# Patient Record
Sex: FEMALE | Race: WHITE | Employment: OTHER | URBAN - METROPOLITAN AREA
[De-identification: names, ages, dates, MRNs, and addresses within clinical notes are randomized per-mention and may not be internally consistent; named-entity substitution may affect disease eponyms.]

---

## 2017-05-30 ENCOUNTER — APPOINTMENT (OUTPATIENT)
Dept: CT IMAGING | Age: 72
End: 2017-05-30
Attending: EMERGENCY MEDICINE
Payer: MEDICARE

## 2017-05-30 ENCOUNTER — HOSPITAL ENCOUNTER (EMERGENCY)
Age: 72
Discharge: HOME | End: 2017-05-30
Attending: EMERGENCY MEDICINE
Payer: MEDICARE

## 2017-05-30 VITALS
BODY MASS INDEX: 37.06 KG/M2 | HEIGHT: 62 IN | SYSTOLIC BLOOD PRESSURE: 141 MMHG | TEMPERATURE: 97.4 F | RESPIRATION RATE: 18 BRPM | OXYGEN SATURATION: 94 % | DIASTOLIC BLOOD PRESSURE: 87 MMHG | WEIGHT: 201.4 LBS | HEART RATE: 86 BPM

## 2017-05-30 DIAGNOSIS — R51.9 NONINTRACTABLE HEADACHE, UNSPECIFIED CHRONICITY PATTERN, UNSPECIFIED HEADACHE TYPE: Primary | ICD-10-CM

## 2017-05-30 PROCEDURE — 70450 CT HEAD/BRAIN W/O DYE: CPT

## 2017-05-30 PROCEDURE — 99282 EMERGENCY DEPT VISIT SF MDM: CPT

## 2017-05-30 NOTE — Clinical Note
Continue your ibuprofen for pain. Return to ER for any fever, chills, nausea, vomiting, change in behavior, worsening or change in pain.

## 2017-05-30 NOTE — ED PROVIDER NOTES
HPI Comments: 67 y.o. female with past medical history significant for HTN and anxiety who presents from home with chief complaint of headache. Pt complains of slight headache that started Sunday morning. Headache was gradual onset. Pt also states that she woke up at that time with dried blood in her mouth. Pt denies any sign of biting her tongue or epistaxis, but notes that she does have a history of epistaxis and had a dehumidifier in her bedroom. Pt recalls that she fell and hit the back of her head on the edge of the tub on May 5th and was seen at [facility]. Pt had lesly on her scalp and had no complications with staple removal. Pt notes that there is slight tenderness at the site of injury. Pt states that she tore her pectoral muscles in the past from falling backwards and has a knot on her chest. Pt has been taking ibuprofen at home for pain with relief. Pt was told to come to the ED by PCP's nurse. Pt denies any LOC after the fall. Pt also denies any nausea, blurry vision, double vision, neck pain, dizziness, lightheadedness, abdominal pain or dysuria. No chest pain or shortness of breath. no difficulty breathing. Pt also denies any use of blood thinners. There are no other acute medical concerns at this time. Social hx: Former smoker, occasional EtOH use    PCP: Mindy Ashford MD    Note written by Dev Sparrow, as dictated by JERAD Escobar 3:35 PM        The history is provided by the patient. No  was used.         Past Medical History:   Diagnosis Date    Hypertension     Psychiatric disorder     anxiety       Past Surgical History:   Procedure Laterality Date    HX GYN      tubal ligation    HX HEENT  2003    embolization for nosebleed    HX ORTHOPAEDIC  2015    L THR    HX TONSILLECTOMY      VASCULAR SURGERY PROCEDURE UNLIST  2003    temproal artery biopsy         Family History:   Problem Relation Age of Onset    Alcohol abuse Father     Tuberculosis Father        Social History     Social History    Marital status:      Spouse name: N/A    Number of children: N/A    Years of education: N/A     Occupational History    Not on file. Social History Main Topics    Smoking status: Former Smoker     Quit date: 5/26/2003    Smokeless tobacco: Not on file    Alcohol use Yes      Comment: Occ    Drug use: Not on file    Sexual activity: Not on file     Other Topics Concern    Not on file     Social History Narrative         ALLERGIES: Review of patient's allergies indicates no known allergies. Review of Systems   Constitutional: Negative for chills and fatigue. HENT: Negative for congestion, ear pain, nosebleeds, rhinorrhea, sore throat and trouble swallowing. Blood in mouth   Eyes: Negative for photophobia and visual disturbance. Respiratory: Negative for cough, chest tightness and shortness of breath. Cardiovascular: Negative for chest pain. Gastrointestinal: Negative for abdominal pain, nausea and vomiting. Genitourinary: Negative for difficulty urinating, dysuria, frequency and urgency. Musculoskeletal: Negative for arthralgias, joint swelling, myalgias, neck pain and neck stiffness. Skin: Negative for color change and rash. Neurological: Positive for headaches. Negative for dizziness, syncope, weakness, light-headedness and numbness. All other systems reviewed and are negative. Vitals:    05/30/17 1433   BP: (!) 152/94   Pulse: 92   Resp: 18   Temp: 97.6 °F (36.4 °C)   SpO2: 97%   Weight: 91.4 kg (201 lb 6.4 oz)   Height: 5' 2\" (1.575 m)            Physical Exam   Constitutional: She is oriented to person, place, and time. She appears well-developed and well-nourished. No distress. HENT:   Head: Normocephalic and atraumatic. Right Ear: External ear normal.   Left Ear: External ear normal.   Nose: Nose normal.   Mouth/Throat: Oropharynx is clear and moist. No oropharyngeal exudate. No injury to tongue.  No lacerations noted. No ecchymosis. No intraoral injury. No noted area of bleeding or dried blood in nostrils. Eyes: Conjunctivae and EOM are normal. Pupils are equal, round, and reactive to light. Neck: Normal range of motion. Neck supple. Painless FROM of neck. No midline tenderness to palpation of cspine   Cardiovascular: Normal rate and regular rhythm. Pulmonary/Chest: Effort normal and breath sounds normal. No respiratory distress. She has no wheezes. Abdominal: Soft. Bowel sounds are normal. She exhibits no mass. There is no tenderness. There is no rebound and no guarding. Musculoskeletal: Normal range of motion. She exhibits no edema or tenderness. 5/5  strength bilaterally  5/5 flexion/extension of hips bilaterally   Neurological: She is alert and oriented to person, place, and time. She has normal reflexes. No cranial nerve deficit. She exhibits normal muscle tone. Coordination normal.   2+ ac, 2+ patellar reflexes bilaterally    Cranial Nerves:  I: smell  Not tested   II: pupils  Equal, round, reactive to light   III,VII: ptosis  none   III,IV,VI: extraocular muscles   normal   V: mastication  normal   V: facial light touch sensation   normal   VII: facial muscle function   symmetric   VIII: hearing  symmetric   IX: soft palate elevation   normal   XI: sternocleidomastoid strength  5/5   XII: tongue   midline          Skin: Skin is warm and dry. No rash noted. No erythema. Psychiatric: She has a normal mood and affect. Her behavior is normal. Judgment and thought content normal.   Nursing note and vitals reviewed. MDM  Number of Diagnoses or Management Options  Nonintractable headache, unspecified chronicity pattern, unspecified headache type:   Diagnosis management comments: 79-year-old female presenting to the emergency room for evaluation of headache. Headache was gradual onset. Headache is relieved with Motrin. The patient has a history of head injury 25 days ago.  She is alert and oriented. No neurological deficits on exam. She is in no distress  Plan: CT    7:33 PM  No signs of bleeding on exam. Abdomen soft and nontender. Pt neurovascularly intact. No deficits on exam. Patient is well hydrated, well appearing, and in no respiratory distress. Physical exam is reassuring, and without signs of serious illness. Will discharge patient home with supportive care, and follow-up with PCP within the next few days. Patient's results have been reviewed with them. Patient and/or family have verbally conveyed their understanding and agreement of the patient's signs, symptoms, diagnosis, treatment and prognosis and additionally agree to follow up as recommended or return to the Emergency Room should their condition change prior to follow-up. Discharge instructions have also been provided to the patient with some educational information regarding their diagnosis as well a list of reasons why they would want to return to the ER prior to their follow-up appointment should their condition change. ED Course       Procedures         Pt has been seen and evaluated by attending physician who has reviewed lab work and imaging tests. He agrees with discharge and prescription plan.

## 2017-05-30 NOTE — DISCHARGE INSTRUCTIONS
We hope that we have addressed all of your medical concerns. The examination and treatment you received in the Emergency Department were for an emergent problem and were not intended as complete care. It is important that you follow up with your healthcare provider(s) for ongoing care. If your symptoms worsen or do not improve as expected, and you are unable to reach your usual health care provider(s), you should return to the Emergency Department. Today's healthcare is undergoing tremendous change, and patient satisfaction surveys are one of the many tools to assess the quality of medical care. You may receive a survey from the [employer] regarding your experience in the Emergency Department. I hope that your experience has been completely positive, particularly the medical care that I provided. As such, please participate in the survey; anything less than excellent does not meet my expectations or intentions. [facility] and [address] [place] [address] participate in nationally recognized quality of care measures. If your blood pressure is greater than 120/80, as reported below, we urge that you seek medical care to address the potential of high blood pressure, commonly known as hypertension. Hypertension can be hereditary or can be caused by certain medical conditions, pain, stress, or \"white coat syndrome. \"       Please make an appointment with your health care provider(s) for follow up of your Emergency Department visit. VITALS:   Patient Vitals for the past 8 hrs:   Temp Pulse Resp BP SpO2   05/30/17 1433 97.6 °F (36.4 °C) 92 18 (!) 152/94 97 %          Thank you for allowing us to provide you with medical care today. We realize that you have many choices for your emergency care needs. Please choose us in the future for any continued health care needs. Shelley Felipe, [address] [place] [address] [address] Office: [phone]            No results found for this or any previous visit (from the past 24 hour(s)). Ct Head Wo Cont    Result Date: 5/30/2017  INDICATION: pain hx of trauma Exam: Noncontrast CT of the brain is performed with 5 mm collimation. CT dose reduction was achieved with the use of the standardized protocol tailored for this examination and automatic exposure control for dose modulation. Adaptive statistical iterative reconstruction (ASIR) was utilized. FINDINGS: There is no acute intracranial hemorrhage, mass, mass effect or herniation. Ventricular system is normal. The gray-white matter differentiation is well-preserved. There is opacification of several right inferior mastoid air cells. The left mastoid air cells are well pneumatized. The visualized paranasal sinuses are normal.     IMPRESSION: No acute intracranial hemorrhage, mass or infarct. Headache: Care Instructions  Your Care Instructions    Headaches have many possible causes. Most headaches aren't a sign of a more serious problem, and they will get better on their own. Home treatment may help you feel better faster. The doctor has checked you carefully, but problems can develop later. If you notice any problems or new symptoms, get medical treatment right away. Follow-up care is a key part of your treatment and safety. Be sure to make and go to all appointments, and call your doctor if you are having problems. It's also a good idea to know your test results and keep a list of the medicines you take. How can you care for yourself at home? · Do not drive if you have taken a prescription pain medicine. · Rest in a quiet, dark room until your headache is gone. Close your eyes and try to relax or go to sleep. Don't watch TV or read. · Put a cold, moist cloth or cold pack on the painful area for 10 to 20 minutes at a time. Put a thin cloth between the cold pack and your skin.   · Use a warm, moist towel or a heating pad set on low to relax tight shoulder and neck muscles. · Have someone gently massage your neck and shoulders. · Take pain medicines exactly as directed. ¨ If the doctor gave you a prescription medicine for pain, take it as prescribed. ¨ If you are not taking a prescription pain medicine, ask your doctor if you can take an over-the-counter medicine. · Be careful not to take pain medicine more often than the instructions allow, because you may get worse or more frequent headaches when the medicine wears off. · Do not ignore new symptoms that occur with a headache, such as a fever, weakness or numbness, vision changes, or confusion. These may be signs of a more serious problem. To prevent headaches  · Keep a headache diary so you can figure out what triggers your headaches. Avoiding triggers may help you prevent headaches. Record when each headache began, how long it lasted, and what the pain was like (throbbing, aching, stabbing, or dull). Write down any other symptoms you had with the headache, such as nausea, flashing lights or dark spots, or sensitivity to bright light or loud noise. Note if the headache occurred near your period. List anything that might have triggered the headache, such as certain foods (chocolate, cheese, wine) or odors, smoke, bright light, stress, or lack of sleep. · Find healthy ways to deal with stress. Headaches are most common during or right after stressful times. Take time to relax before and after you do something that has caused a headache in the past.  · Try to keep your muscles relaxed by keeping good posture. Check your jaw, face, neck, and shoulder muscles for tension, and try relaxing them. When sitting at a desk, change positions often, and stretch for 30 seconds each hour. · Get plenty of sleep and exercise. · Eat regularly and well. Long periods without food can trigger a headache. · Treat yourself to a massage.  Some people find that regular massages are very helpful in relieving tension. · Limit caffeine by not drinking too much coffee, tea, or soda. But don't quit caffeine suddenly, because that can also give you headaches. · Reduce eyestrain from computers by blinking frequently and looking away from the computer screen every so often. Make sure you have proper eyewear and that your monitor is set up properly, about an arm's length away. · Seek help if you have depression or anxiety. Your headaches may be linked to these conditions. Treatment can both prevent headaches and help with symptoms of anxiety or depression. When should you call for help? Call 911 anytime you think you may need emergency care. For example, call if:  · You have signs of a stroke. These may include:  ¨ Sudden numbness, paralysis, or weakness in your face, arm, or leg, especially on only one side of your body. ¨ Sudden vision changes. ¨ Sudden trouble speaking. ¨ Sudden confusion or trouble understanding simple statements. ¨ Sudden problems with walking or balance. ¨ A sudden, severe headache that is different from past headaches. Call your doctor now or seek immediate medical care if:  · You have a new or worse headache. · Your headache gets much worse. Where can you learn more? Go to http://myrna-lexi.info/. Enter M271 in the search box to learn more about \"Headache: Care Instructions. \"  Current as of: October 14, 2016  Content Version: 11.2  © 2279-4400 [employer]. Care instructions adapted under license by [email or web address] (which disclaims liability or warranty for this information). If you have questions about a medical condition or this instruction, always ask your healthcare professional. [place] [address] any warranty or liability for your use of this information.

## 2017-05-30 NOTE — ED NOTES
I personally saw and examined the patient. I have reviewed and agree with the MLP's findings, including all diagnostic interpretations, and plans as written. I was present during the key portions of separately billed procedures.     Kiet Porter MD

## 2017-05-30 NOTE — ED TRIAGE NOTES
Pt. States she fell May 5 and hit her head on the edge of the tub, seen by MD, head wound stapled and had a headache. States feeling fine since then. States starting this weekend started a headache and had dried blood in her mouth upon waking today.

## 2018-12-03 ENCOUNTER — HOSPITAL ENCOUNTER (OUTPATIENT)
Dept: CT IMAGING | Age: 73
Discharge: HOME | End: 2018-12-03
Attending: SURGERY
Payer: MEDICARE

## 2018-12-03 DIAGNOSIS — I70.213 ATHEROSCLEROSIS OF NATIVE ARTERY OF BOTH LOWER EXTREMITIES WITH INTERMITTENT CLAUDICATION (HCC): ICD-10-CM

## 2018-12-03 PROCEDURE — 74011636320 HC RX REV CODE- 636/320: Performed by: RADIOLOGY

## 2018-12-03 PROCEDURE — 74011000258 HC RX REV CODE- 258: Performed by: RADIOLOGY

## 2018-12-03 PROCEDURE — 75635 CT ANGIO ABDOMINAL ARTERIES: CPT

## 2018-12-03 RX ORDER — SODIUM CHLORIDE 0.9 % (FLUSH) 0.9 %
10 SYRINGE (ML) INJECTION
Status: COMPLETED | OUTPATIENT
Start: 2018-12-03 | End: 2018-12-03

## 2018-12-03 RX ADMIN — IOPAMIDOL 100 ML: 755 INJECTION, SOLUTION INTRAVENOUS at 12:06

## 2018-12-03 RX ADMIN — SODIUM CHLORIDE 100 ML: 900 INJECTION, SOLUTION INTRAVENOUS at 12:06

## 2018-12-03 RX ADMIN — Medication 10 ML: at 12:06

## 2018-12-05 ENCOUNTER — HOSPITAL ENCOUNTER (OUTPATIENT)
Dept: PREADMISSION TESTING | Age: 73
Discharge: HOME | End: 2018-12-05
Attending: SURGERY
Payer: MEDICARE

## 2018-12-05 ENCOUNTER — HOSPITAL ENCOUNTER (OUTPATIENT)
Dept: GENERAL RADIOLOGY | Age: 73
Discharge: HOME | End: 2018-12-05
Attending: SURGERY
Payer: MEDICARE

## 2018-12-05 VITALS
OXYGEN SATURATION: 95 % | TEMPERATURE: 97.7 F | SYSTOLIC BLOOD PRESSURE: 137 MMHG | RESPIRATION RATE: 16 BRPM | WEIGHT: 201.28 LBS | DIASTOLIC BLOOD PRESSURE: 79 MMHG | BODY MASS INDEX: 37.04 KG/M2 | HEART RATE: 73 BPM | HEIGHT: 62 IN

## 2018-12-05 DIAGNOSIS — R06.83 SNORING: Primary | ICD-10-CM

## 2018-12-05 LAB
ABO + RH BLD: NORMAL
ALBUMIN SERPL-MCNC: 3.6 G/DL (ref 3.5–5)
ALBUMIN/GLOB SERPL: 0.8 {RATIO} (ref 1.1–2.2)
ALP SERPL-CCNC: 142 U/L (ref 45–117)
ALT SERPL-CCNC: 26 U/L (ref 12–78)
ANION GAP SERPL CALC-SCNC: 6 MMOL/L (ref 5–15)
APPEARANCE UR: CLEAR
APTT PPP: 27.1 SEC (ref 22.1–32)
AST SERPL-CCNC: 14 U/L (ref 15–37)
ATRIAL RATE: 74 BPM
BACTERIA URNS QL MICRO: NEGATIVE /HPF
BILIRUB SERPL-MCNC: 0.3 MG/DL (ref 0.2–1)
BILIRUB UR QL: NEGATIVE
BLOOD GROUP ANTIBODIES SERPL: NORMAL
BUN SERPL-MCNC: 22 MG/DL (ref 6–20)
BUN/CREAT SERPL: 26 (ref 12–20)
CALCIUM SERPL-MCNC: 9.4 MG/DL (ref 8.5–10.1)
CALCULATED P AXIS, ECG09: 41 DEGREES
CALCULATED R AXIS, ECG10: 22 DEGREES
CALCULATED T AXIS, ECG11: 32 DEGREES
CHLORIDE SERPL-SCNC: 103 MMOL/L (ref 97–108)
CO2 SERPL-SCNC: 30 MMOL/L (ref 21–32)
COLOR UR: ABNORMAL
CREAT SERPL-MCNC: 0.85 MG/DL (ref 0.55–1.02)
DIAGNOSIS, 93000: NORMAL
EPITH CASTS URNS QL MICRO: ABNORMAL /LPF
ERYTHROCYTE [DISTWIDTH] IN BLOOD BY AUTOMATED COUNT: 14.4 % (ref 11.5–14.5)
GLOBULIN SER CALC-MCNC: 4.6 G/DL (ref 2–4)
GLUCOSE SERPL-MCNC: 95 MG/DL (ref 65–100)
GLUCOSE UR STRIP.AUTO-MCNC: NEGATIVE MG/DL
HCT VFR BLD AUTO: 40 % (ref 35–47)
HGB BLD-MCNC: 12.6 G/DL (ref 11.5–16)
HGB UR QL STRIP: NEGATIVE
HYALINE CASTS URNS QL MICRO: ABNORMAL /LPF (ref 0–5)
INR PPP: 1 (ref 0.9–1.1)
KETONES UR QL STRIP.AUTO: NEGATIVE MG/DL
LEUKOCYTE ESTERASE UR QL STRIP.AUTO: ABNORMAL
MCH RBC QN AUTO: 28.9 PG (ref 26–34)
MCHC RBC AUTO-ENTMCNC: 31.5 G/DL (ref 30–36.5)
MCV RBC AUTO: 91.7 FL (ref 80–99)
NITRITE UR QL STRIP.AUTO: NEGATIVE
NRBC # BLD: 0 K/UL (ref 0–0.01)
NRBC BLD-RTO: 0 PER 100 WBC
P-R INTERVAL, ECG05: 164 MS
PH UR STRIP: 5.5 [PH] (ref 5–8)
PLATELET # BLD AUTO: 401 K/UL (ref 150–400)
PMV BLD AUTO: 10.1 FL (ref 8.9–12.9)
POTASSIUM SERPL-SCNC: 3.8 MMOL/L (ref 3.5–5.1)
PROT SERPL-MCNC: 8.2 G/DL (ref 6.4–8.2)
PROT UR STRIP-MCNC: NEGATIVE MG/DL
PROTHROMBIN TIME: 10.1 SEC (ref 9–11.1)
Q-T INTERVAL, ECG07: 410 MS
QRS DURATION, ECG06: 96 MS
QTC CALCULATION (BEZET), ECG08: 455 MS
RBC # BLD AUTO: 4.36 M/UL (ref 3.8–5.2)
RBC #/AREA URNS HPF: ABNORMAL /HPF (ref 0–5)
SODIUM SERPL-SCNC: 139 MMOL/L (ref 136–145)
SP GR UR REFRACTOMETRY: 1.02 (ref 1–1.03)
SPECIMEN EXP DATE BLD: NORMAL
THERAPEUTIC RANGE,PTTT: NORMAL SECS (ref 58–77)
UA: UC IF INDICATED,UAUC: ABNORMAL
UROBILINOGEN UR QL STRIP.AUTO: 0.2 EU/DL (ref 0.2–1)
VENTRICULAR RATE, ECG03: 74 BPM
WBC # BLD AUTO: 8.6 K/UL (ref 3.6–11)
WBC URNS QL MICRO: ABNORMAL /HPF (ref 0–4)

## 2018-12-05 PROCEDURE — 80053 COMPREHEN METABOLIC PANEL: CPT

## 2018-12-05 PROCEDURE — 81001 URINALYSIS AUTO W/SCOPE: CPT

## 2018-12-05 PROCEDURE — 93005 ELECTROCARDIOGRAM TRACING: CPT

## 2018-12-05 PROCEDURE — 86901 BLOOD TYPING SEROLOGIC RH(D): CPT

## 2018-12-05 PROCEDURE — 36415 COLL VENOUS BLD VENIPUNCTURE: CPT

## 2018-12-05 PROCEDURE — 85730 THROMBOPLASTIN TIME PARTIAL: CPT

## 2018-12-05 PROCEDURE — 85027 COMPLETE CBC AUTOMATED: CPT

## 2018-12-05 PROCEDURE — 85610 PROTHROMBIN TIME: CPT

## 2018-12-05 PROCEDURE — 71046 X-RAY EXAM CHEST 2 VIEWS: CPT

## 2018-12-05 RX ORDER — CEFAZOLIN SODIUM/WATER 2 G/20 ML
2 SYRINGE (ML) INTRAVENOUS ONCE
Status: CANCELLED | OUTPATIENT
Start: 2018-12-10 | End: 2018-12-10

## 2018-12-05 RX ORDER — GUAIFENESIN 100 MG/5ML
81 LIQUID (ML) ORAL DAILY
COMMUNITY

## 2018-12-05 RX ORDER — LEVOTHYROXINE SODIUM 50 UG/1
50 TABLET ORAL
COMMUNITY

## 2018-12-05 RX ORDER — SODIUM CHLORIDE, SODIUM LACTATE, POTASSIUM CHLORIDE, CALCIUM CHLORIDE 600; 310; 30; 20 MG/100ML; MG/100ML; MG/100ML; MG/100ML
25 INJECTION, SOLUTION INTRAVENOUS CONTINUOUS
Status: CANCELLED | OUTPATIENT
Start: 2018-12-10

## 2018-12-05 NOTE — PERIOP NOTES
Incentive Tomasz Mercado Using the incentive spirometer helps expand the small air sacs of your lungs, helps you breathe deeply, and helps improve your lung function. Use your incentive spirometer twice a day (10 breaths each time) prior to surgery. How to Use Your Incentive Spirometer: 1. Hold the incentive spirometer in an upright position. 2. Breathe out as usual.  
3. Place the mouthpiece in your mouth and seal your lips tightly around it. 4. Take a deep breath. Breathe in slowly and as deeply as possible. Keep the blue flow rate guide between the arrows. 5. Hold your breath as long as possible. Then exhale slowly and allow the piston to fall to the bottom of the column. 6. Rest for a few seconds and repeat steps one through five at least 10 times. PAT Tidal Volume____1750______________  x______10__________  Date_____12/5/18___________ BRING THE INCENTIVE SPIROMETER WITH YOU TO THE HOSPITAL ON THE DAY OF YOUR SURGERY. Opportunity given to ask and answer questions as well as to observe return demonstration. Patient signature_____________________________    Witness____________________________

## 2018-12-05 NOTE — ADVANCED PRACTICE NURSE
JARRED 5 in PAT assessment. Pt admits to snoring loud enough to be heard through a closed door, pauses in breathing while sleeping but denies ever having been referred for a sleep apnea evaluation. Denies prior complications from anesthesia. Denies decreased cervical ROM. Has permanent crowns in upper front. Referral sent to [facility]. Pt agreeable to referral for sleep apnea evaluation.

## 2018-12-05 NOTE — PERIOP NOTES
[facility] Preoperative Instructions Surgery Date 12/10/18        Time of Arrival 0730 am    Contact # [phone] 1. On the day of your surgery, please report to the Surgical Services Registration Desk and sign in at your designated time. The Surgery Center is located to the right of the Emergency Room. 2. You must have someone with you to drive you home. You should not drive a car for 24 hours following surgery. Please make arrangements for a friend or family member to stay with you for the first 24 hours after your surgery. 3. Do not have anything to eat or drink (including water, gum, mints, coffee, juice) after midnight ?12/9/18 . ? This may not apply to medications prescribed by your physician. ?(Please note below the special instructions with medications to take the morning of your procedure.) 4. We recommend you do not drink any alcoholic beverages for 24 hours before and after your surgery. 5. Contact your surgeons office for instructions on the following medications: non-steroidal anti-inflammatory drugs (i.e. Advil, Aleve), vitamins, and supplements. (Some surgeons will want you to stop these medications prior to surgery and others may allow you to take them) **If you are currently taking Plavix, Coumadin, Aspirin and/or other blood-thinning agents, contact your surgeon for instructions. ** Your surgeon will partner with the physician prescribing these medications to determine if it is safe to stop or if you need to continue taking. Please do not stop taking these medications without instructions from your surgeon 6. Wear comfortable clothes. Wear glasses instead of contacts. Do not bring any money or jewelry. Please bring picture ID, insurance card, and any prearranged co-payment or hospital payment. Do not wear make-up, particularly mascara the morning of your surgery.   Do not wear nail polish, particularly if you are having foot /hand surgery. Wear your hair loose or down, no ponytails, buns, irasema pins or clips. All body piercings must be removed. Please shower with antibacterial soap for three consecutive days before and on the morning of surgery, but do not apply any lotions, powders or deodorants after the shower on the day of surgery. Please use a fresh towels after each shower. Please sleep in clean clothes and change bed linens the night before surgery. Please do not shave for 48 hours prior to surgery. Shaving of the face is acceptable. 7. You should understand that if you do not follow these instructions your surgery may be cancelled. If your physical condition changes (I.e. fever, cold or flu) please contact your surgeon as soon as possible. 8. It is important that you be on time. If a situation occurs where you may be late, please call [phone] (OR Holding Area). 9. If you have any questions and or problems, please call [phone] (Pre-admission Testing). 10. Your surgery time may be subject to change. You will receive a phone call the evening prior if your time changes. 11.  If having outpatient surgery, you must have someone to drive you here, stay with you during the duration of your stay, and to drive you home at time of discharge. 12.   In an effort to improve the efficiency, privacy, and safety for all of our Pre-op patients visitors are not allowed in the Holding area. Once you arrive and are registered your family/visitors will be asked to remain in the waiting room. The Pre-op staff will get you from the Surgical Waiting Area and will explain to you and your family/visitors that the Pre-op phase is beginning. The staff will answer any questions and provide instructions for tracking of the patient, by use of the existing tracking number and color-coded status board in the waiting room.   At this time the staff will also ask for your designated spokesperson information in the event that the physician or staff need to provide an update or obtain any pertinent information. The designated spokesperson will be notified if the physician needs to speak to family during the pre-operative phase. If at any time your family/visitors has questions or concerns they may approach the volunteer desk in the waiting area for assistance. Special Instructions: Use Incentive Spirometer 20 times daily prior to surgery. MEDICATIONS TO TAKE THE MORNING OF SURGERY WITH A SIP OF WATER: Amlodipine, Citalopram, Levothyroxine, Triamterene-HCTZ I understand a pre-operative phone call will be made to verify my surgery time. In the event that I am not available, I give permission for a message to be left on my answering service and/or with another person? yes 
 
 
 
 ___________________      __________   _________ 
  (Signature of Patient)             (Witness)                (Date and Time)

## 2018-12-10 ENCOUNTER — ANESTHESIA EVENT (OUTPATIENT)
Dept: SURGERY | Age: 73
DRG: 254 | End: 2018-12-10
Payer: MEDICARE

## 2018-12-10 ENCOUNTER — HOSPITAL ENCOUNTER (INPATIENT)
Age: 73
LOS: 2 days | Discharge: HOME HEALTH | DRG: 254 | End: 2018-12-12
Attending: SURGERY | Admitting: SURGERY
Payer: MEDICARE

## 2018-12-10 ENCOUNTER — ANESTHESIA (OUTPATIENT)
Dept: SURGERY | Age: 73
DRG: 254 | End: 2018-12-10
Payer: MEDICARE

## 2018-12-10 ENCOUNTER — APPOINTMENT (OUTPATIENT)
Dept: GENERAL RADIOLOGY | Age: 73
DRG: 254 | End: 2018-12-10
Attending: SURGERY
Payer: MEDICARE

## 2018-12-10 DIAGNOSIS — I70.202 FEMORAL ARTERY OCCLUSION, LEFT (HCC): Primary | ICD-10-CM

## 2018-12-10 PROCEDURE — 04UL07Z SUPPLEMENT LEFT FEMORAL ARTERY WITH AUTOLOGOUS TISSUE SUBSTITUTE, OPEN APPROACH: ICD-10-PCS | Performed by: SURGERY

## 2018-12-10 PROCEDURE — 04CL0ZZ EXTIRPATION OF MATTER FROM LEFT FEMORAL ARTERY, OPEN APPROACH: ICD-10-PCS | Performed by: SURGERY

## 2018-12-10 PROCEDURE — 76001 XR FLUOROSCOPY OVER 60 MINUTES: CPT

## 2018-12-10 PROCEDURE — 77030002996 HC SUT SLK J&J -A: Performed by: SURGERY

## 2018-12-10 PROCEDURE — 74011250636 HC RX REV CODE- 250/636: Performed by: SURGERY

## 2018-12-10 PROCEDURE — 76210000017 HC OR PH I REC 1.5 TO 2 HR: Performed by: SURGERY

## 2018-12-10 PROCEDURE — 65270000029 HC RM PRIVATE

## 2018-12-10 PROCEDURE — 77030013079 HC BLNKT BAIR HGGR 3M -A: Performed by: NURSE ANESTHETIST, CERTIFIED REGISTERED

## 2018-12-10 PROCEDURE — 77030020153 HC PRB DOPLR DISP MIZU -C: Performed by: SURGERY

## 2018-12-10 PROCEDURE — 77030002987 HC SUT PROL J&J -B: Performed by: SURGERY

## 2018-12-10 PROCEDURE — 74011250636 HC RX REV CODE- 250/636

## 2018-12-10 PROCEDURE — 77030020256 HC SOL INJ NACL 0.9%  500ML: Performed by: SURGERY

## 2018-12-10 PROCEDURE — 77030013797 HC KT TRNSDUC PRSSR EDWD -A: Performed by: SURGERY

## 2018-12-10 PROCEDURE — 88311 DECALCIFY TISSUE: CPT

## 2018-12-10 PROCEDURE — 77030019908 HC STETH ESOPH SIMS -A: Performed by: NURSE ANESTHETIST, CERTIFIED REGISTERED

## 2018-12-10 PROCEDURE — B41D1ZZ FLUOROSCOPY OF AORTA AND BILATERAL LOWER EXTREMITY ARTERIES USING LOW OSMOLAR CONTRAST: ICD-10-PCS | Performed by: SURGERY

## 2018-12-10 PROCEDURE — 76060000036 HC ANESTHESIA 2.5 TO 3 HR: Performed by: SURGERY

## 2018-12-10 PROCEDURE — 77030011640 HC PAD GRND REM COVD -A: Performed by: SURGERY

## 2018-12-10 PROCEDURE — 77030018673: Performed by: SURGERY

## 2018-12-10 PROCEDURE — C1894 INTRO/SHEATH, NON-LASER: HCPCS | Performed by: SURGERY

## 2018-12-10 PROCEDURE — 74011250637 HC RX REV CODE- 250/637: Performed by: SURGERY

## 2018-12-10 PROCEDURE — 74011250636 HC RX REV CODE- 250/636: Performed by: ANESTHESIOLOGY

## 2018-12-10 PROCEDURE — 77030018846 HC SOL IRR STRL H20 ICUM -A: Performed by: SURGERY

## 2018-12-10 PROCEDURE — 74011250637 HC RX REV CODE- 250/637

## 2018-12-10 PROCEDURE — 74011000250 HC RX REV CODE- 250

## 2018-12-10 PROCEDURE — 77030008684 HC TU ET CUF COVD -B: Performed by: NURSE ANESTHETIST, CERTIFIED REGISTERED

## 2018-12-10 PROCEDURE — 77030008771 HC TU NG SALEM SUMP -A: Performed by: NURSE ANESTHETIST, CERTIFIED REGISTERED

## 2018-12-10 PROCEDURE — 77030014008 HC SPNG HEMSTAT J&J -C: Performed by: SURGERY

## 2018-12-10 PROCEDURE — 74011000272 HC RX REV CODE- 272: Performed by: SURGERY

## 2018-12-10 PROCEDURE — 88304 TISSUE EXAM BY PATHOLOGIST: CPT

## 2018-12-10 PROCEDURE — 72190 X-RAY EXAM OF PELVIS: CPT

## 2018-12-10 PROCEDURE — 77030002916 HC SUT ETHLN J&J -A: Performed by: SURGERY

## 2018-12-10 PROCEDURE — 76010000172 HC OR TIME 2.5 TO 3 HR INTENSV-TIER 1: Performed by: SURGERY

## 2018-12-10 PROCEDURE — 77030026438 HC STYL ET INTUB CARD -A: Performed by: NURSE ANESTHETIST, CERTIFIED REGISTERED

## 2018-12-10 PROCEDURE — 77030031139 HC SUT VCRL2 J&J -A: Performed by: SURGERY

## 2018-12-10 PROCEDURE — 74011636320 HC RX REV CODE- 636/320: Performed by: SURGERY

## 2018-12-10 PROCEDURE — 74011000250 HC RX REV CODE- 250: Performed by: SURGERY

## 2018-12-10 RX ORDER — GUAIFENESIN 100 MG/5ML
81 LIQUID (ML) ORAL DAILY
Status: DISCONTINUED | OUTPATIENT
Start: 2018-12-11 | End: 2018-12-12 | Stop reason: HOSPADM

## 2018-12-10 RX ORDER — LABETALOL HYDROCHLORIDE 5 MG/ML
INJECTION, SOLUTION INTRAVENOUS AS NEEDED
Status: DISCONTINUED | OUTPATIENT
Start: 2018-12-10 | End: 2018-12-10 | Stop reason: HOSPADM

## 2018-12-10 RX ORDER — ONDANSETRON 2 MG/ML
INJECTION INTRAMUSCULAR; INTRAVENOUS AS NEEDED
Status: DISCONTINUED | OUTPATIENT
Start: 2018-12-10 | End: 2018-12-10 | Stop reason: HOSPADM

## 2018-12-10 RX ORDER — FENTANYL CITRATE 50 UG/ML
25 INJECTION, SOLUTION INTRAMUSCULAR; INTRAVENOUS
Status: DISCONTINUED | OUTPATIENT
Start: 2018-12-10 | End: 2018-12-10 | Stop reason: ALTCHOICE

## 2018-12-10 RX ORDER — NEOSTIGMINE METHYLSULFATE 1 MG/ML
INJECTION INTRAVENOUS AS NEEDED
Status: DISCONTINUED | OUTPATIENT
Start: 2018-12-10 | End: 2018-12-10 | Stop reason: HOSPADM

## 2018-12-10 RX ORDER — LIDOCAINE HYDROCHLORIDE 20 MG/ML
INJECTION, SOLUTION EPIDURAL; INFILTRATION; INTRACAUDAL; PERINEURAL AS NEEDED
Status: DISCONTINUED | OUTPATIENT
Start: 2018-12-10 | End: 2018-12-10 | Stop reason: HOSPADM

## 2018-12-10 RX ORDER — ONDANSETRON 2 MG/ML
4 INJECTION INTRAMUSCULAR; INTRAVENOUS AS NEEDED
Status: DISCONTINUED | OUTPATIENT
Start: 2018-12-10 | End: 2018-12-10 | Stop reason: ALTCHOICE

## 2018-12-10 RX ORDER — HEPARIN SODIUM 1000 [USP'U]/ML
INJECTION, SOLUTION INTRAVENOUS; SUBCUTANEOUS AS NEEDED
Status: DISCONTINUED | OUTPATIENT
Start: 2018-12-10 | End: 2018-12-10 | Stop reason: HOSPADM

## 2018-12-10 RX ORDER — CLOPIDOGREL BISULFATE 75 MG/1
75 TABLET ORAL DAILY
Status: DISCONTINUED | OUTPATIENT
Start: 2018-12-10 | End: 2018-12-12 | Stop reason: HOSPADM

## 2018-12-10 RX ORDER — ROCURONIUM BROMIDE 10 MG/ML
INJECTION, SOLUTION INTRAVENOUS AS NEEDED
Status: DISCONTINUED | OUTPATIENT
Start: 2018-12-10 | End: 2018-12-10 | Stop reason: HOSPADM

## 2018-12-10 RX ORDER — MORPHINE SULFATE 2 MG/ML
2 INJECTION, SOLUTION INTRAMUSCULAR; INTRAVENOUS
Status: DISCONTINUED | OUTPATIENT
Start: 2018-12-10 | End: 2018-12-12 | Stop reason: HOSPADM

## 2018-12-10 RX ORDER — CITALOPRAM 20 MG/1
20 TABLET, FILM COATED ORAL
Status: DISCONTINUED | OUTPATIENT
Start: 2018-12-11 | End: 2018-12-12 | Stop reason: HOSPADM

## 2018-12-10 RX ORDER — ENOXAPARIN SODIUM 100 MG/ML
40 INJECTION SUBCUTANEOUS EVERY 24 HOURS
Status: DISCONTINUED | OUTPATIENT
Start: 2018-12-10 | End: 2018-12-12 | Stop reason: HOSPADM

## 2018-12-10 RX ORDER — OXYCODONE AND ACETAMINOPHEN 5; 325 MG/1; MG/1
2 TABLET ORAL
Status: DISCONTINUED | OUTPATIENT
Start: 2018-12-10 | End: 2018-12-12 | Stop reason: HOSPADM

## 2018-12-10 RX ORDER — SUCCINYLCHOLINE CHLORIDE 20 MG/ML
INJECTION INTRAMUSCULAR; INTRAVENOUS AS NEEDED
Status: DISCONTINUED | OUTPATIENT
Start: 2018-12-10 | End: 2018-12-10 | Stop reason: HOSPADM

## 2018-12-10 RX ORDER — FENTANYL CITRATE 50 UG/ML
50 INJECTION, SOLUTION INTRAMUSCULAR; INTRAVENOUS AS NEEDED
Status: DISCONTINUED | OUTPATIENT
Start: 2018-12-10 | End: 2018-12-10 | Stop reason: ALTCHOICE

## 2018-12-10 RX ORDER — MIDAZOLAM HYDROCHLORIDE 1 MG/ML
1 INJECTION, SOLUTION INTRAMUSCULAR; INTRAVENOUS AS NEEDED
Status: DISCONTINUED | OUTPATIENT
Start: 2018-12-10 | End: 2018-12-10 | Stop reason: ALTCHOICE

## 2018-12-10 RX ORDER — PHENYLEPHRINE HCL IN 0.9% NACL 0.4MG/10ML
SYRINGE (ML) INTRAVENOUS AS NEEDED
Status: DISCONTINUED | OUTPATIENT
Start: 2018-12-10 | End: 2018-12-10 | Stop reason: HOSPADM

## 2018-12-10 RX ORDER — LIDOCAINE HYDROCHLORIDE 10 MG/ML
0.1 INJECTION, SOLUTION EPIDURAL; INFILTRATION; INTRACAUDAL; PERINEURAL AS NEEDED
Status: DISCONTINUED | OUTPATIENT
Start: 2018-12-10 | End: 2018-12-10 | Stop reason: ALTCHOICE

## 2018-12-10 RX ORDER — CEFAZOLIN SODIUM/WATER 2 G/20 ML
2 SYRINGE (ML) INTRAVENOUS ONCE
Status: COMPLETED | OUTPATIENT
Start: 2018-12-10 | End: 2018-12-10

## 2018-12-10 RX ORDER — MORPHINE SULFATE 10 MG/ML
2 INJECTION, SOLUTION INTRAMUSCULAR; INTRAVENOUS
Status: DISCONTINUED | OUTPATIENT
Start: 2018-12-10 | End: 2018-12-10 | Stop reason: ALTCHOICE

## 2018-12-10 RX ORDER — SODIUM CHLORIDE, SODIUM LACTATE, POTASSIUM CHLORIDE, CALCIUM CHLORIDE 600; 310; 30; 20 MG/100ML; MG/100ML; MG/100ML; MG/100ML
25 INJECTION, SOLUTION INTRAVENOUS CONTINUOUS
Status: DISCONTINUED | OUTPATIENT
Start: 2018-12-10 | End: 2018-12-10

## 2018-12-10 RX ORDER — CEFAZOLIN SODIUM/WATER 2 G/20 ML
2 SYRINGE (ML) INTRAVENOUS EVERY 8 HOURS
Status: COMPLETED | OUTPATIENT
Start: 2018-12-10 | End: 2018-12-11

## 2018-12-10 RX ORDER — ONDANSETRON 2 MG/ML
4 INJECTION INTRAMUSCULAR; INTRAVENOUS
Status: DISCONTINUED | OUTPATIENT
Start: 2018-12-10 | End: 2018-12-12 | Stop reason: HOSPADM

## 2018-12-10 RX ORDER — SODIUM CHLORIDE 9 MG/ML
50 INJECTION, SOLUTION INTRAVENOUS CONTINUOUS
Status: DISCONTINUED | OUTPATIENT
Start: 2018-12-10 | End: 2018-12-11

## 2018-12-10 RX ORDER — FENTANYL CITRATE 50 UG/ML
INJECTION, SOLUTION INTRAMUSCULAR; INTRAVENOUS AS NEEDED
Status: DISCONTINUED | OUTPATIENT
Start: 2018-12-10 | End: 2018-12-10 | Stop reason: HOSPADM

## 2018-12-10 RX ORDER — KETOROLAC TROMETHAMINE 30 MG/ML
30 INJECTION, SOLUTION INTRAMUSCULAR; INTRAVENOUS
Status: DISCONTINUED | OUTPATIENT
Start: 2018-12-10 | End: 2018-12-12 | Stop reason: HOSPADM

## 2018-12-10 RX ORDER — GLYCOPYRROLATE 0.2 MG/ML
INJECTION INTRAMUSCULAR; INTRAVENOUS AS NEEDED
Status: DISCONTINUED | OUTPATIENT
Start: 2018-12-10 | End: 2018-12-10 | Stop reason: HOSPADM

## 2018-12-10 RX ORDER — AMLODIPINE BESYLATE 5 MG/1
5 TABLET ORAL DAILY
Status: DISCONTINUED | OUTPATIENT
Start: 2018-12-11 | End: 2018-12-12 | Stop reason: HOSPADM

## 2018-12-10 RX ORDER — TRIAMTERENE AND HYDROCHLOROTHIAZIDE 37.5; 25 MG/1; MG/1
1 CAPSULE ORAL DAILY
Status: DISCONTINUED | OUTPATIENT
Start: 2018-12-11 | End: 2018-12-11

## 2018-12-10 RX ORDER — OXYCODONE AND ACETAMINOPHEN 5; 325 MG/1; MG/1
TABLET ORAL
Status: COMPLETED
Start: 2018-12-10 | End: 2018-12-10

## 2018-12-10 RX ORDER — LEVOTHYROXINE SODIUM 50 UG/1
50 TABLET ORAL
Status: DISCONTINUED | OUTPATIENT
Start: 2018-12-11 | End: 2018-12-12 | Stop reason: HOSPADM

## 2018-12-10 RX ORDER — PROPOFOL 10 MG/ML
INJECTION, EMULSION INTRAVENOUS AS NEEDED
Status: DISCONTINUED | OUTPATIENT
Start: 2018-12-10 | End: 2018-12-10 | Stop reason: HOSPADM

## 2018-12-10 RX ORDER — SODIUM CHLORIDE, SODIUM LACTATE, POTASSIUM CHLORIDE, CALCIUM CHLORIDE 600; 310; 30; 20 MG/100ML; MG/100ML; MG/100ML; MG/100ML
25 INJECTION, SOLUTION INTRAVENOUS CONTINUOUS
Status: DISCONTINUED | OUTPATIENT
Start: 2018-12-10 | End: 2018-12-10 | Stop reason: ALTCHOICE

## 2018-12-10 RX ADMIN — Medication 2 G: at 18:46

## 2018-12-10 RX ADMIN — FENTANYL CITRATE 25 MCG: 50 INJECTION, SOLUTION INTRAMUSCULAR; INTRAVENOUS at 11:24

## 2018-12-10 RX ADMIN — OXYCODONE AND ACETAMINOPHEN 1 TABLET: 5; 325 TABLET ORAL at 12:53

## 2018-12-10 RX ADMIN — Medication 80 MCG: at 08:17

## 2018-12-10 RX ADMIN — FENTANYL CITRATE 50 MCG: 50 INJECTION, SOLUTION INTRAMUSCULAR; INTRAVENOUS at 10:17

## 2018-12-10 RX ADMIN — FENTANYL CITRATE 25 MCG: 50 INJECTION, SOLUTION INTRAMUSCULAR; INTRAVENOUS at 11:59

## 2018-12-10 RX ADMIN — HEPARIN SODIUM 5000 UNITS: 1000 INJECTION, SOLUTION INTRAVENOUS; SUBCUTANEOUS at 09:10

## 2018-12-10 RX ADMIN — FENTANYL CITRATE 50 MCG: 50 INJECTION, SOLUTION INTRAMUSCULAR; INTRAVENOUS at 08:41

## 2018-12-10 RX ADMIN — CLOPIDOGREL BISULFATE 75 MG: 75 TABLET ORAL at 14:42

## 2018-12-10 RX ADMIN — NEOSTIGMINE METHYLSULFATE 3 MG: 1 INJECTION INTRAVENOUS at 10:47

## 2018-12-10 RX ADMIN — Medication 2 G: at 08:22

## 2018-12-10 RX ADMIN — SUCCINYLCHOLINE CHLORIDE 120 MG: 20 INJECTION INTRAMUSCULAR; INTRAVENOUS at 08:12

## 2018-12-10 RX ADMIN — LIDOCAINE HYDROCHLORIDE 100 MG: 20 INJECTION, SOLUTION EPIDURAL; INFILTRATION; INTRACAUDAL; PERINEURAL at 08:12

## 2018-12-10 RX ADMIN — FENTANYL CITRATE 25 MCG: 50 INJECTION, SOLUTION INTRAMUSCULAR; INTRAVENOUS at 11:45

## 2018-12-10 RX ADMIN — GLYCOPYRROLATE 0.5 MG: 0.2 INJECTION INTRAMUSCULAR; INTRAVENOUS at 10:47

## 2018-12-10 RX ADMIN — LABETALOL HYDROCHLORIDE 5 MG: 5 INJECTION, SOLUTION INTRAVENOUS at 09:38

## 2018-12-10 RX ADMIN — ROCURONIUM BROMIDE 20 MG: 10 INJECTION, SOLUTION INTRAVENOUS at 09:05

## 2018-12-10 RX ADMIN — SODIUM CHLORIDE, SODIUM LACTATE, POTASSIUM CHLORIDE, AND CALCIUM CHLORIDE 25 ML/HR: 600; 310; 30; 20 INJECTION, SOLUTION INTRAVENOUS at 07:53

## 2018-12-10 RX ADMIN — Medication 80 MCG: at 10:27

## 2018-12-10 RX ADMIN — ENOXAPARIN SODIUM 40 MG: 40 INJECTION, SOLUTION INTRAVENOUS; SUBCUTANEOUS at 23:46

## 2018-12-10 RX ADMIN — PROPOFOL 120 MG: 10 INJECTION, EMULSION INTRAVENOUS at 08:12

## 2018-12-10 RX ADMIN — FENTANYL CITRATE 50 MCG: 50 INJECTION, SOLUTION INTRAMUSCULAR; INTRAVENOUS at 10:45

## 2018-12-10 RX ADMIN — LABETALOL HYDROCHLORIDE 5 MG: 5 INJECTION, SOLUTION INTRAVENOUS at 10:44

## 2018-12-10 RX ADMIN — ONDANSETRON 4 MG: 2 INJECTION INTRAMUSCULAR; INTRAVENOUS at 10:44

## 2018-12-10 RX ADMIN — FENTANYL CITRATE 25 MCG: 50 INJECTION, SOLUTION INTRAMUSCULAR; INTRAVENOUS at 11:34

## 2018-12-10 RX ADMIN — ROCURONIUM BROMIDE 30 MG: 10 INJECTION, SOLUTION INTRAVENOUS at 08:18

## 2018-12-10 RX ADMIN — FENTANYL CITRATE 50 MCG: 50 INJECTION, SOLUTION INTRAMUSCULAR; INTRAVENOUS at 08:08

## 2018-12-10 RX ADMIN — FENTANYL CITRATE 50 MCG: 50 INJECTION, SOLUTION INTRAMUSCULAR; INTRAVENOUS at 08:43

## 2018-12-10 RX ADMIN — FENTANYL CITRATE 50 MCG: 50 INJECTION, SOLUTION INTRAMUSCULAR; INTRAVENOUS at 08:12

## 2018-12-10 NOTE — ANESTHESIA POSTPROCEDURE EVALUATION
Procedure(s): LEFT FEMORAL ENDARTERECTOMY WITH VEIN PATCH. Anesthesia Post Evaluation Patient location during evaluation: PACU Note status: Adequate. Level of consciousness: responsive to verbal stimuli and sleepy but conscious Pain management: satisfactory to patient Airway patency: patent Anesthetic complications: no 
Cardiovascular status: acceptable Respiratory status: acceptable Hydration status: acceptable Comments: +Post-Anesthesia Evaluation and Assessment Patient: Ariella Dumont MRN: [id number]  SSN: [id number] YOB: 1945  Age: 68 y.o. Sex: female Cardiovascular Function/Vital Signs /54   Pulse 69   Temp 36.6 °C (97.8 °F)   Resp 16   Ht 5' 2\" (1.575 m)   Wt 91.2 kg (201 lb 1 oz)   SpO2 97%   BMI 36.77 kg/m² Patient is status post Procedure(s): LEFT FEMORAL ENDARTERECTOMY WITH VEIN PATCH. Nausea/Vomiting: Controlled. Postoperative hydration reviewed and adequate. Pain: 
Pain Scale 1: Numeric (0 - 10) (12/10/18 1200) Pain Intensity 1: 5 (12/10/18 1200) Managed. Neurological Status:  
Neuro (WDL): Within Defined Limits (12/10/18 0759) At baseline. Mental Status and Level of Consciousness: Arousable. Pulmonary Status:  
O2 Device: Nasal cannula (12/10/18 1110) Adequate oxygenation and airway patent. Complications related to anesthesia: None Post-anesthesia assessment completed. No concerns. Signed By: Hedy Gordon MD  
 12/10/2018 Post anesthesia nausea and vomiting:  controlled Visit Vitals /54 Pulse 69 Temp 36.6 °C (97.8 °F) Resp 16 Ht 5' 2\" (1.575 m) Wt 91.2 kg (201 lb 1 oz) SpO2 97% BMI 36.77 kg/m²

## 2018-12-10 NOTE — OP NOTES
[facility]  OPERATIVE REPORT    Brandon Lawson  MR#: [id number]  : 1945  ACCOUNT #: [de-identified]   DATE OF SERVICE: 12/10/2018    PREOPERATIVE DIAGNOSIS:  Left femoral artery occlusion. POSTOPERATIVE DIAGNOSIS:  Left femoral artery occlusion. PROCEDURES PERFORMED:  1. Left groin exploration with left common femoral artery endarterectomy and greater saphenous vein patch angioplasty. 2.  Sheath placement with intrapelvic aortogram and pelvic arteriogram.  3.  Left leg runoff arteriogram using fluoroscopy. 4.  Intraoperative pressure monitoring. SURGEON:  Deisy Almendarez MD    ANESTHESIA:  General.    SPECIMENS REMOVED:  None. ESTIMATED BLOOD LOSS:  150 mL from flushing. INDICATIONS:  The patient is a 77-year-old woman who approximately 2 weeks ago underwent bilateral percutaneous common femoral artery access for kissing iliac stents. Several days after the procedure, she noticed a recurrence of symptoms on the left and was found on noninvasive testing to have a focal occlusion of her distal common femoral artery just above the bifurcation probably related to the access site and closure device. PROCEDURE AS FOLLOWS:  After informed consent, the patient was placed supine on the operating table. After adequate induction of general anesthesia, site and patient confirmation, administration of prophylactic antibiotics, the lower abdomen, left groin, entire left leg prepped and draped in usual sterile fashion. Vertical incision was made in the left groin and the operative field from just above the inguinal ligament to below the femoral bifurcation was created. The saphenofemoral junction and proximal saphenous vein were identified. The distal external iliac and common femoral artery were identified and were surprisingly scarred given no prior operative procedures only percutaneous one. The femoral artery was dissected to its bifurcation.   A StarClose closure device was noted (as suggested on CT scan) to be positioned at the 12 o'clock position on the surface of the vessel. This was a somewhat lower than expected and was 5-6 mm above the bifurcation. The superficial femoral and profunda femoral arteries were dissected free and controlled. The patient was systemically heparinized. A generous longitudinal arteriotomy was made. The artery was occluded at the access site by a heavy eccentric plaque. A complete endarterectomy from distal external iliac to the origin of the superficial femoral artery was undertaken with a good endpoint. The endarterectomized segment was meticulously inspected for loose debris, irrigated and evacuated. A 7-Wolof sheath was placed retrograde into the external iliac artery and an aortogram and pelvic arteriogram confirmed that the kissing iliac stents were both widely patent. There was no external iliac artery disease on the left. A sterile pressure tubing was passed from anesthesia on to the field and the external iliac pressure was transduced and was a mean of 69, which was identical to the brachial pressure on the right, which was obtained by cuff. The 7 sheath was then redirected antegrade down the leg and an arteriogram from that point to the foot confirmed no further occlusive disease or defects. The superficial femoral artery is widely patent as 3-vessel runoff to the foot, ankle. The proximal greater saphenous vein was harvested and used to vein patch the arteriotomy using 2 continuous Prolene sutures. At the completion of the patch angioplasty, there was excellent flow in the profunda femoris and superficial femoral artery and there is a palpable dorsal pedal pulse at the foot. The wound was copiously irrigated with antibiotic solution and closed in 3 layers of Vicryl suture and the layer of skin staples. Patient tolerated the procedure well. There were no complications.       MD NISH Rodriguez / HARRY  D: 12/10/2018 14:34     T: 12/10/2018 15:50  JOB #: [id number]  CC: Meagan Alexandra MD  CC: Jose Armando Goldsmith MD

## 2018-12-10 NOTE — PERIOP NOTES
12:00 room assignment pending,  received post op update 13:55 room assignment still pending,  & other family mbrs visiting with patient in pacu. 15:48 room 2103 in process of being cleaned. Rodriguez Rosen notified of pending room assignment via cell phone [phone] 16:49 Report called to floor. Rhea SWAIN reports room in process of being cleansed. 17:45 Josie Mcmillan RN on floor viewed left groin drsg remains dry & intact. No hematoma or bleeding

## 2018-12-10 NOTE — PERIOP NOTES
Handoff Report from Operating Room to PACU Report received from [facility]  and [facility] regarding Chandan Brown. Surgeon(s): 
Quin Kidd MD  And Procedure(s) (LRB): LEFT FEMORAL ENDARTERECTOMY WITH VEIN PATCH (Left)  confirmed  
with allergies and dressings discussed. Anesthesia type, drugs, patient history, complications, estimated blood loss, vital signs, intake and output, and last pain medication and reversal medications were reviewed.

## 2018-12-10 NOTE — PERIOP NOTES
TRANSFER - OUT REPORT: 
 
Verbal report given to Rhea SWAIN  on Naa Bear  being transferred to 2103(unit) for routine post - op Report consisted of patients Situation, Background, Assessment and  
Recommendations(SBAR). Information from the following report(s) SBAR, OR Summary, Procedure Summary, Intake/Output, MAR and Cardiac Rhythm NSR was reviewed with the receiving nurse. Opportunity for questions and clarification was provided. Patient transported with: 
 O2 @ 2 liters Registered Nurse Tech

## 2018-12-10 NOTE — ANESTHESIA PREPROCEDURE EVALUATION
Anesthetic History No history of anesthetic complications Review of Systems / Medical History Patient summary reviewed, nursing notes reviewed and pertinent labs reviewed Pulmonary Within defined limits Neuro/Psych Psychiatric history Cardiovascular Hypertension PAD Exercise tolerance: >4 METS 
  
GI/Hepatic/Renal 
Within defined limits Endo/Other Hypothyroidism Obesity Other Findings Physical Exam 
 
Airway Mallampati: III 
TM Distance: 4 - 6 cm Neck ROM: normal range of motion Mouth opening: Normal 
 
 Cardiovascular Rhythm: regular Rate: normal 
 
 
 
 Dental 
 
Dentition: Caps/crowns, Bridges, Lower dentition intact and Upper dentition intact Pulmonary Breath sounds clear to auscultation Abdominal 
GI exam deferred Other Findings Anesthetic Plan ASA: 3 Anesthesia type: general 
 
 
 
 
Induction: Intravenous Anesthetic plan and risks discussed with: Patient

## 2018-12-10 NOTE — BRIEF OP NOTE
BRIEF OPERATIVE NOTE Date of Procedure: 12/10/2018 Preoperative Diagnosis: L femoral artery occlusion Postoperative Diagnosis: same Procedure(s): L CFA endarterectomy w/GSV patch Pelvic arteriogram 
                          LLE runoff Intra-op pressure monitoring Surgeon: Christiana Acosta MD  
Anesthesia: General  
Estimated Blood Loss: 150cc of flushing Specimens:  
ID Type Source Tests Collected by Time Destination 1 : LEFT FEMORAL ARTERY PLAQUE Preservative Artery  Equilla MD Nelda 12/10/2018 4024 Pathology Findings: occluding plaque femoral bifurcation at site of closure device 2+ L DP pulse in PACU Complications: none Implants: * No implants in log *

## 2018-12-11 LAB
ANION GAP SERPL CALC-SCNC: 7 MMOL/L (ref 5–15)
BUN SERPL-MCNC: 16 MG/DL (ref 6–20)
BUN/CREAT SERPL: 19 (ref 12–20)
CALCIUM SERPL-MCNC: 9.1 MG/DL (ref 8.5–10.1)
CHLORIDE SERPL-SCNC: 105 MMOL/L (ref 97–108)
CO2 SERPL-SCNC: 27 MMOL/L (ref 21–32)
CREAT SERPL-MCNC: 0.83 MG/DL (ref 0.55–1.02)
ERYTHROCYTE [DISTWIDTH] IN BLOOD BY AUTOMATED COUNT: 14.5 % (ref 11.5–14.5)
GLUCOSE SERPL-MCNC: 157 MG/DL (ref 65–100)
HCT VFR BLD AUTO: 33 % (ref 35–47)
HGB BLD-MCNC: 10.3 G/DL (ref 11.5–16)
MCH RBC QN AUTO: 28.7 PG (ref 26–34)
MCHC RBC AUTO-ENTMCNC: 31.2 G/DL (ref 30–36.5)
MCV RBC AUTO: 91.9 FL (ref 80–99)
NRBC # BLD: 0 K/UL (ref 0–0.01)
NRBC BLD-RTO: 0 PER 100 WBC
PLATELET # BLD AUTO: 399 K/UL (ref 150–400)
PMV BLD AUTO: 10.1 FL (ref 8.9–12.9)
POTASSIUM SERPL-SCNC: 3.7 MMOL/L (ref 3.5–5.1)
RBC # BLD AUTO: 3.59 M/UL (ref 3.8–5.2)
SODIUM SERPL-SCNC: 139 MMOL/L (ref 136–145)
WBC # BLD AUTO: 12.3 K/UL (ref 3.6–11)

## 2018-12-11 PROCEDURE — 65270000029 HC RM PRIVATE

## 2018-12-11 PROCEDURE — 74011250636 HC RX REV CODE- 250/636: Performed by: SURGERY

## 2018-12-11 PROCEDURE — 85027 COMPLETE CBC AUTOMATED: CPT

## 2018-12-11 PROCEDURE — 80048 BASIC METABOLIC PNL TOTAL CA: CPT

## 2018-12-11 PROCEDURE — 74011250637 HC RX REV CODE- 250/637: Performed by: SURGERY

## 2018-12-11 PROCEDURE — 36415 COLL VENOUS BLD VENIPUNCTURE: CPT

## 2018-12-11 RX ORDER — TRIAMTERENE/HYDROCHLOROTHIAZID 37.5-25 MG
1 TABLET ORAL DAILY
Status: DISCONTINUED | OUTPATIENT
Start: 2018-12-11 | End: 2018-12-11

## 2018-12-11 RX ORDER — TRIAMTERENE/HYDROCHLOROTHIAZID 37.5-25 MG
1 TABLET ORAL DAILY
Status: DISCONTINUED | OUTPATIENT
Start: 2018-12-11 | End: 2018-12-12 | Stop reason: HOSPADM

## 2018-12-11 RX ADMIN — Medication 2 G: at 01:14

## 2018-12-11 RX ADMIN — CITALOPRAM HYDROBROMIDE 20 MG: 20 TABLET ORAL at 06:31

## 2018-12-11 RX ADMIN — CLOPIDOGREL BISULFATE 75 MG: 75 TABLET ORAL at 08:56

## 2018-12-11 RX ADMIN — TRIAMTERENE AND HYDROCHLOROTHIAZIDE 1 TABLET: 37.5; 25 TABLET ORAL at 09:02

## 2018-12-11 RX ADMIN — ENOXAPARIN SODIUM 40 MG: 40 INJECTION, SOLUTION INTRAVENOUS; SUBCUTANEOUS at 22:49

## 2018-12-11 RX ADMIN — AMLODIPINE BESYLATE 5 MG: 5 TABLET ORAL at 08:56

## 2018-12-11 RX ADMIN — LEVOTHYROXINE SODIUM 50 MCG: 50 TABLET ORAL at 05:51

## 2018-12-11 RX ADMIN — ASPIRIN 81 MG 81 MG: 81 TABLET ORAL at 08:56

## 2018-12-11 NOTE — PROGRESS NOTES
PCP ANTOINETTE appt scheduled with Dr. Michael Terry on 12/20/2018 at 2:45pm. Appt added to AVS. Dominic Handy CM Specialist

## 2018-12-11 NOTE — PROGRESS NOTES
Assumed care of pt at [facility], pt in stable condition w/o voiding post operatively. RN had concerns r/t to voiding and preformed a bladder scan. Pt had a residual post void of 200 ml/hr and pt still had 750 ml. When the olmedo was placed she had 1500 ml of output when the olmedo was placed. Pt has been resting comfortably. Vitals:  
Blood pressure 110/75, pulse 80, temperature 98.4 °F (36.9 °C), resp. rate 16, height 5' 2\" (1.575 m), weight 91.2 kg (201 lb 1 oz), SpO2 98 %.

## 2018-12-11 NOTE — PROGRESS NOTES
General Surgery CM attempted to see Pt but she was on the phone. She will call me once her phone call is complete. 3:35 PM  Reason for Admission:   Pt was admitted on 12/10/18 d/t a Dx of PVD                   RRAT Score:          5           Plan for utilizing home health:      Not indicated at this time. Pt is not homebound. .                    Likelihood of Readmission:  LOW                         Transition of Care Plan: CM reviewed demographic information with Pt. Pt lives with spouse in two story condo with 5 OLVIN and 17 Steps inside to second floor bedroom. No DME. No experience with HH or SNF. I W ADLs and Drives. Pt pharmacy is [facility] [address] is anticipating DC tomorrow, 12/12/18. Spouse will transport home in car. No anticipated needs. CM emailed CM specialist to make PCP appt. CM will continue to monitor discharge plan. Care Management Interventions  PCP Verified by CM: Yes  Palliative Care Criteria Met (RRAT>21 & CHF Dx)?: No  Mode of Transport at Discharge:  Other (see comment)  Transition of Care Consult (CM Consult): Discharge Planning  MyChart Signup: No  Discharge Durable Medical Equipment: No  Physical Therapy Consult: No  Occupational Therapy Consult: No  Speech Therapy Consult: No  Current Support Network: Lives with Spouse, Own Home  Confirm Follow Up Transport: Self  Plan discussed with Pt/Family/Caregiver: Yes  Freedom of Choice Offered: Yes  Discharge Location  Discharge Placement: Home with family assistance    Ru Keyes

## 2018-12-11 NOTE — PROGRESS NOTES
Initial Nutrition Assessment:    INTERVENTIONS/RECOMMENDATIONS:   · Meals/Snacks: General/healthful diet: Continue cardiac diet    ASSESSMENT:   Patient medically noted for femoral artery occlusion s/p endarterectomy. Receiving a cardiac diet. Patient reports a good appetite currently and PTA. Ate 100% of lunch today. No recent weight changes. Provided patient with menu and explanation of room service. Encouraged intake of meals. Diet Order: Cardiac  % Eaten:    Patient Vitals for the past 72 hrs:   % Diet Eaten   12/10/18 1905 100 %       Pertinent Medications: [x]Reviewed []Other: Norvasc, Celexa, Plavix, Synthroid, Maxzide  Pertinent Labs: [x]Reviewed []Other:   Food Allergies: [x]None []Other   Last BM: 12/9  []Active     []Hyperactive  []Hypoactive       [] Absent BS  Skin:    [] Intact   [x] Incision  [] Breakdown: [] Edema []Other:    Anthropometrics:   Height: 5' 2\" (157.5 cm) Weight: 91.2 kg (201 lb 1 oz)   IBW (%IBW):   ( ) UBW (%UBW):   (  %)   Last Weight Metrics:  Weight Loss Metrics 12/10/2018 12/5/2018 5/30/2017 12/27/2012 12/26/2012 5/31/2011 5/26/2011   Today's Wt 201 lb 1 oz 201 lb 4.5 oz 201 lb 6.4 oz 180 lb 180 lb 193 lb 193 lb   BMI 36.77 kg/m2 36.81 kg/m2 36.84 kg/m2 34.03 kg/m2 34.03 kg/m2 35.88 kg/m2 35.88 kg/m2       BMI: Body mass index is 36.77 kg/m². This BMI is indicative of:   []Underweight    []Normal    []Overweight    [x] Obesity   [] Extreme Obesity (BMI>40)     Estimated Nutrition Needs (Based on):   6141 Kcals/day(BMR (1368) x 1. 3AF) , 73 g(-91g (0.8-1.0 g/kg bw)) Protein  Carbohydrate:  At Least 130 g/day  Fluids: 1800 mL/day (1ml/kcal)    Pt expected to meet estimated nutrient needs: [x]Yes []No    NUTRITION DIAGNOSES:   Problem:  No nutritional diagnosis at this time      Etiology: related to       Signs/Symptoms: as evidenced by        NUTRITION INTERVENTIONS:  Meals/Snacks: General/healthful diet                  GOAL:   PO intake >70% of meals next 5-7 days    LEARNING NEEDS (Diet, Food/Nutrient-Drug Interaction):    [x] None Identified   [] Identified and Education Provided/Documented   [] Identified and Pt declined/was not appropriate     Cultural, Gnosticist, OR Ethnic Dietary Needs:    [x] None Identified   [] Identified and Addressed     [x] Interdisciplinary Care Plan Reviewed/Documented    [x] Discharge Planning: Heart healthy diet        MONITORING /EVALUATION:     Food/Nutrient Intake Outcomes:  Total energy intake  Physical Signs/Symptoms Outcomes: Weight/weight change    NUTRITION RISK:    [] High              [] Moderate           [x]  Low  []  Minimal/Uncompromised    PT SEEN FOR:    []  MD Consult: []Calorie Count      []Diabetic Diet Education        []Diet Education     []Electrolyte Management     []General Nutrition Management and Supplements     []Management of Tube Feeding     []TPN Recommendations    [x]  RN Referral:  [x]MST score >=2     []Enteral/Parenteral Nutrition PTA     []Pregnant: Gestational DM or Multigestation     []Pressure Ulcer/Wound Care needs        []  Low BMI  []  TRAE Clementsdora Pair  Pager [phone]                 Weekend Pager [phone]

## 2018-12-11 NOTE — PROGRESS NOTES
POD#1 Urinary retention overnight No c/o Groin dry 2+ L DP pulse Labs OK Lines and tubes out 
mobilize

## 2018-12-12 ENCOUNTER — HOME HEALTH ADMISSION (OUTPATIENT)
Dept: HOME HEALTH SERVICES | Facility: HOME HEALTH | Age: 73
End: 2018-12-12

## 2018-12-12 VITALS
OXYGEN SATURATION: 98 % | HEIGHT: 62 IN | HEART RATE: 84 BPM | DIASTOLIC BLOOD PRESSURE: 55 MMHG | WEIGHT: 201.06 LBS | RESPIRATION RATE: 12 BRPM | BODY MASS INDEX: 37 KG/M2 | SYSTOLIC BLOOD PRESSURE: 140 MMHG | TEMPERATURE: 98.7 F

## 2018-12-12 PROCEDURE — 97161 PT EVAL LOW COMPLEX 20 MIN: CPT | Performed by: PHYSICAL THERAPIST

## 2018-12-12 PROCEDURE — G8980 MOBILITY D/C STATUS: HCPCS | Performed by: PHYSICAL THERAPIST

## 2018-12-12 PROCEDURE — 97116 GAIT TRAINING THERAPY: CPT | Performed by: PHYSICAL THERAPIST

## 2018-12-12 PROCEDURE — G8979 MOBILITY GOAL STATUS: HCPCS | Performed by: PHYSICAL THERAPIST

## 2018-12-12 PROCEDURE — 97530 THERAPEUTIC ACTIVITIES: CPT | Performed by: PHYSICAL THERAPIST

## 2018-12-12 PROCEDURE — 74011250637 HC RX REV CODE- 250/637: Performed by: SURGERY

## 2018-12-12 PROCEDURE — G8978 MOBILITY CURRENT STATUS: HCPCS | Performed by: PHYSICAL THERAPIST

## 2018-12-12 RX ORDER — CLOPIDOGREL BISULFATE 75 MG/1
75 TABLET ORAL DAILY
Qty: 30 TAB | Refills: 3 | Status: SHIPPED | OUTPATIENT
Start: 2018-12-13 | End: 2019-02-14

## 2018-12-12 RX ORDER — HYDROCODONE BITARTRATE AND ACETAMINOPHEN 5; 325 MG/1; MG/1
1 TABLET ORAL
Qty: 20 TAB | Refills: 0 | Status: SHIPPED | OUTPATIENT
Start: 2018-12-12 | End: 2019-02-09

## 2018-12-12 RX ADMIN — ASPIRIN 81 MG 81 MG: 81 TABLET ORAL at 09:35

## 2018-12-12 RX ADMIN — CITALOPRAM HYDROBROMIDE 20 MG: 20 TABLET ORAL at 07:16

## 2018-12-12 RX ADMIN — LEVOTHYROXINE SODIUM 50 MCG: 50 TABLET ORAL at 07:16

## 2018-12-12 RX ADMIN — AMLODIPINE BESYLATE 5 MG: 5 TABLET ORAL at 09:34

## 2018-12-12 RX ADMIN — CLOPIDOGREL BISULFATE 75 MG: 75 TABLET ORAL at 09:35

## 2018-12-12 RX ADMIN — TRIAMTERENE AND HYDROCHLOROTHIAZIDE 1 TABLET: 37.5; 25 TABLET ORAL at 09:35

## 2018-12-12 NOTE — PROGRESS NOTES
physical Therapy EVALUATION/DISCHARGE  Patient: Brooklyn Epperson (20 y.o. female)  Date: 12/12/2018  Primary Diagnosis: ASO WITH CLAUDICATION  Femoral artery occlusion, left (HCC)  Procedure(s) (LRB):  LEFT FEMORAL ENDARTERECTOMY WITH VEIN PATCH (Left) 2 Days Post-Op     ASSESSMENT :  Based on the objective data described below, the patient presents with impaired mobility, balance and endurance with increased discomfort at surgery site which limits functional independence. Patient requiring additional time for all mobility tasks. She requires min A for bed mobility. Patient is able to ambulate 70 feet without device with SBA- gait is slightly unsteady demonstrating short shuffled and antalgic steps. One minor loss of balance noted but patient able to self correct. Patient able to ascend/descend 11 stairs using B railing with CGA. Patient educated on safety on stairs. All questions answered. Patient reports complete independence at baseline. She lives with her  who works during the day. Patient is currently functioning below her functional baseline. Recommend HHPT for safety evaluation following discharge to ensure safe mobility within home environment. Patient has discharge orders written and will d/c home later today. Will sign off. PLAN :  Discharge Recommendations: Home Health  Further Equipment Recommendations for Discharge: has appropriate equipment if needed     SUBJECTIVE:   Patient stated I'm feeling a little queasy right now.     OBJECTIVE DATA SUMMARY:   HISTORY:    Past Medical History:   Diagnosis Date    Hypertension     Psychiatric disorder     anxiety    Thyroid disease     Hypothyroid     Past Surgical History:   Procedure Laterality Date    HX GYN      tubal ligation    HX HEENT  2003    embolization for nosebleed    HX TONSILLECTOMY      age 25    TOTAL HIP ARTHROPLASTY Left 2015    L THR    VASCULAR SURGERY PROCEDURE UNLIST  2003    temproal artery biopsy    VASCULAR SURGERY PROCEDURE UNLIST  7798    stents (umbilical area); Dr. Jorge Luis Bass     Prior Level of Function/Home Situation: patient reports complete independence with all mobility and ADLs; Home Situation  Home Environment: Private residence  # Steps to Enter: 5  Rails to Enter: Yes  Hand Rails : Bilateral  One/Two Story Residence: Two story  # of Interior Steps: 15  Interior Rails: Both  Living Alone: No  Support Systems: Spouse/Significant Other/Partner  Patient Expects to be Discharged to[de-identified] Private residence  Current DME Used/Available at Home: Shower chair, Cane, straight, Walker, rolling  Tub or Shower Type: Tub/Shower combination    EXAMINATION/PRESENTATION/DECISION MAKING:   Critical Behavior:  Neurologic State: Appropriate for age  Orientation Level: Oriented X4  Cognition: Follows commands     Hearing: Auditory  Auditory Impairment: None    Range Of Motion:  AROM: Generally decreased, functional(excpet R shoulder grossly limited 2/2 pain following shot)                       Strength:    Strength: Generally decreased, functional                    Tone & Sensation:   Tone: Normal                              Coordination:  Coordination: Within functional limits  Functional Mobility:  Bed Mobility:  Rolling: Modified independent; Additional time  Supine to Sit: Minimum assistance; Additional time  Sit to Supine: Minimum assistance; Additional time  Scooting: Supervision; Additional time  Transfers:  Sit to Stand: Stand-by assistance; Additional time  Stand to Sit: Stand-by assistance; Additional time                       Balance:   Sitting: Intact  Standing: Impaired  Standing - Static: Good; Unsupported  Standing - Dynamic : Good(overall with one minor LOB-able to self correct)  Ambulation/Gait Training:  Distance (ft): 70 Feet (ft)  Assistive Device: Gait belt  Ambulation - Level of Assistance: Stand-by assistance        Gait Abnormalities: Antalgic;Decreased step clearance; Path deviations        Base of Support: Widened     Speed/Ibis: Pace decreased (<100 feet/min); Slow  Step Length: Left shortened;Right shortened         gait is slightly unsteady with one minor LOB which patient was able to self correct independently;          Stairs:  Number of Stairs Trained: 11  Stairs - Level of Assistance: Contact guard assistance   Rail Use: Both         Functional Measure:    Elder Mobility Scale    15/20         EMS and G-code impairment scale:  Percentage of impairment CH  0% CI  1-19% CJ  20-39% CK  40-59% CL  60-79% CM  80-99% CN  100%   EMS Score 0-20 20 17-19 13-16 9-12 5-8 1-4 0      Scores under 10  generally these patients are dependent in mobility maneuvers; require help with  basic ADL, such as transfers, toileting and dressing. Scores between 10  13  generally these patients are borderline in terms of safe mobility and  independence in ADL i.e. they require some help with some mobility maneuvers. Scores over 14  Generally these patients are able to perform mobility maneuvers alone and safely  and are independent in basic ADL. G codes: In compliance with CMSs Claims Based Outcome Reporting, the following G-code set was chosen for this patient based on their primary functional limitation being treated: The outcome measure chosen to determine the severity of the functional limitation was the Elderly Mobility scale with a score of 15/20 which was correlated with the impairment scale. ? Mobility - Walking and Moving Around:     - CURRENT STATUS: CJ - 20%-39% impaired, limited or restricted    - GOAL STATUS: CJ - 20%-39% impaired, limited or restricted    - D/C STATUS:  CJ - 20%-39% impaired, limited or restricted            Pain:   patient reports soreness in groin area but no pain        Activity Tolerance:   VSS  Please refer to the flowsheet for vital signs taken during this treatment.   After treatment:   []   Patient left in no apparent distress sitting up in chair  [x] Patient left in no apparent distress in bed  [x]   Call bell left within reach  [x]   Nursing notified  []   Caregiver present  []   Bed alarm activated    COMMUNICATION/EDUCATION:   Communication/Collaboration:  [x]   Fall prevention education was provided and the patient/caregiver indicated understanding. [x]   Patient/family have participated as able and agree with findings and recommendations. []   Patient is unable to participate in plan of care at this time.   Findings and recommendations were discussed with: Registered Nurse,  and Rehabilitation Attendant    Thank you for this referral.  Will Mccray, PT   Time Calculation: 26 mins

## 2018-12-12 NOTE — PROGRESS NOTES
Assumed care of patient at [address] [place] [address], pt in stable condition w/o complaints of pain. Pt did have concerns about whether they could take medication at night to assist with sleep. Pt voided as well over night, and did not complain of discomfort. Pt appears to be resting comfortably. Vital:   Blood pressure 129/57, pulse 81, temperature 97.7 °F (36.5 °C), resp. rate 18, height 5' 2\" (1.575 m), weight 91.2 kg (201 lb 1 oz), SpO2 94 %.

## 2018-12-12 NOTE — PROGRESS NOTES
12: 28PM  D/c order acknowledged by CM. Pt to d/c home with no CM needs. Pt's  to transport home at d/c. PCP appt scheduled and on AVS. Pt ready for d/c from CM perspective. 2:58PM  PC from PT. PT saw pt and recommending HH for safety eval. CM met with pt who is agreeable. Would like to use [facility] for [facility]. Agreeable to other agency if [facility] not available. FOC signed and on chart. Referral sent to [facility] through [address] [place] [address]. Waiting for response. 3:20PM  Referral accepted by [facility]. Information added to AVS. Pt now ready for d/c from CM perspective. CM available for any additional needs.      Daniel New, MSW  Care Manager

## 2018-12-12 NOTE — PROGRESS NOTES
General Surgery End of Shift Nursing Note    Bedside shift change report given to WILIAM Meyers (oncoming nurse) by [facility], RN (offgoing nurse). Report included the following information SBAR, Kardex, MAR and Accordion. Shift worked:   7am-7pm   Summary of shift:    Wolfe removed this morning. Pt voided. Pt ambulated in the paul and in the room. NO complaints this shift. Issues for physician to address:   None     Number times ambulated in hallway past shift: 2    Number of times OOB to chair past shift: 2    Pain Management:  Current medication:   Patient states pain is manageable on current pain medication: denies pain    GI:    Current diet:  DIET CARDIAC Regular    Tolerating current diet: YES  Passing flatus: YES  Last Bowel Movement: yesterday   Appearance: prior to admission    Respiratory:    Incentive Spirometer at bedside: NO  Patient instructed on use: NO    Patient Safety:    Falls Score: 1  Bed Alarm On? No  Sitter?  No    Danielle Rail

## 2018-12-12 NOTE — DISCHARGE INSTRUCTIONS
Patient Discharge Instructions    Terra Luis / [id number] : 1945    Admitted 12/10/2018 Discharged: 2018     What to do at Home  Recommended activity: Elevate leg  May remove dressing and shower Saturday - try to keep dry till then  No driving       Information obtained by :  I understand that if any problems occur once I am at home I am to contact my physician. I understand and acknowledge receipt of the instructions indicated above.                                                                                                                                            R.N.'s Signature                                                                  Date/Time                                                                                                                                              Patient or Representative Signature                                                          Date/Time      Ky Castañeda MD

## 2018-12-13 ENCOUNTER — HOME CARE VISIT (OUTPATIENT)
Dept: HOME HEALTH SERVICES | Facility: HOME HEALTH | Age: 73
End: 2018-12-13

## 2018-12-14 ENCOUNTER — HOME CARE VISIT (OUTPATIENT)
Dept: HOME HEALTH SERVICES | Facility: HOME HEALTH | Age: 73
End: 2018-12-14

## 2018-12-14 NOTE — PROGRESS NOTES
Pts Rx for narcotics left behind at d/c. Pt was notified but did not come to  Rx. Call to pt today to ask if she needed the Rx. Pt states no, she does not need it. Rx put into the shredder.

## 2018-12-17 NOTE — DISCHARGE SUMMARY
[facility] [address]    Gayathri Bergeron  MR#: [id number]  : 1945  ACCOUNT #: [de-identified]   ADMIT DATE: 12/10/2018  DISCHARGE DATE: 2018    ADMITTING DIAGNOSIS:  Left femoral artery occlusion. PROCEDURE PERFORMED:  Left femoral endarterectomy with vein patch angioplasty      ATTENDING PHYSICIAN:  Dr. Corazon Ledezma    HISTORY OF PRESENT ILLNESS AND HOSPITAL COURSE:  The patient is a 72-year-old woman who, 2 weeks prior to admission, underwent bilateral percutaneous common femoral artery access for iliac angioplasty and stenting. Several days after the procedure, she was noted to have recurrence of her left leg symptoms and was found on noninvasive testing to have a focal occlusion of her distal common femoral artery at the site of her access and at the site of her closure procedure. This femoral artery occlusion was presumed to be from the percutaneous procedure and the closure device. She was admitted for groin exploration, underwent extensive femoral artery endarterectomy, where there was a large obstructing calcified plaque just beneath the area of access and closure. She had a vein patch angioplasty. Intraoperative imaging revealed her inflow stent to be widely patent and there to be no pressure gradient across the aortoiliac segments, and distal outflow and runoff to be pristine. She had an unremarkable postoperative course and was discharged home the second postoperative day with a palpable dorsal pedal pulse and able to walk comfortably including stairs. She was sent home on Plavix and aspirin and a small supply of analgesics for pain. Asked to return to my office in 1 week. Do no driving until seen in the office. DISPOSITION:  Home.       MD NISH Lawson/ARTHUR  D: 2018 11:41     T: 2018 12:20  JOB #: [id number]  CC: Sadi Ferguson MD

## 2019-01-27 ENCOUNTER — APPOINTMENT (OUTPATIENT)
Dept: ULTRASOUND IMAGING | Age: 74
End: 2019-01-27
Attending: PHYSICIAN ASSISTANT
Payer: MEDICARE

## 2019-01-27 ENCOUNTER — HOSPITAL ENCOUNTER (EMERGENCY)
Age: 74
Discharge: HOME | End: 2019-01-27
Attending: EMERGENCY MEDICINE
Payer: MEDICARE

## 2019-01-27 VITALS
RESPIRATION RATE: 14 BRPM | TEMPERATURE: 98.1 F | HEART RATE: 82 BPM | HEIGHT: 62 IN | SYSTOLIC BLOOD PRESSURE: 152 MMHG | WEIGHT: 189 LBS | BODY MASS INDEX: 34.78 KG/M2 | OXYGEN SATURATION: 99 % | DIASTOLIC BLOOD PRESSURE: 82 MMHG

## 2019-01-27 DIAGNOSIS — N93.9 VAGINAL BLEEDING: Primary | ICD-10-CM

## 2019-01-27 DIAGNOSIS — D64.9 ANEMIA, UNSPECIFIED TYPE: ICD-10-CM

## 2019-01-27 LAB
ANION GAP BLD CALC-SCNC: 18 MMOL/L (ref 10–20)
APPEARANCE UR: CLEAR
BACTERIA URNS QL MICRO: NEGATIVE /HPF
BILIRUB UR QL: NEGATIVE
BUN BLD-MCNC: 13 MG/DL (ref 9–20)
CA-I BLD-MCNC: 1.2 MMOL/L (ref 1.12–1.32)
CHLORIDE BLD-SCNC: 105 MMOL/L (ref 98–107)
CO2 BLD-SCNC: 24 MMOL/L (ref 21–32)
COLOR UR: ABNORMAL
COMMENT, HOLDF: NORMAL
CREAT BLD-MCNC: 0.9 MG/DL (ref 0.6–1.3)
EPITH CASTS URNS QL MICRO: ABNORMAL /LPF
ERYTHROCYTE [DISTWIDTH] IN BLOOD BY AUTOMATED COUNT: 16 % (ref 11.5–14.5)
GLUCOSE BLD-MCNC: 114 MG/DL (ref 65–100)
GLUCOSE UR STRIP.AUTO-MCNC: NEGATIVE MG/DL
HCT VFR BLD AUTO: 32.1 % (ref 35–47)
HCT VFR BLD CALC: 32 % (ref 35–47)
HGB BLD-MCNC: 9.3 G/DL (ref 11.5–16)
HGB UR QL STRIP: ABNORMAL
KETONES UR QL STRIP.AUTO: NEGATIVE MG/DL
LEUKOCYTE ESTERASE UR QL STRIP.AUTO: NEGATIVE
MCH RBC QN AUTO: 25.2 PG (ref 26–34)
MCHC RBC AUTO-ENTMCNC: 29 G/DL (ref 30–36.5)
MCV RBC AUTO: 87 FL (ref 80–99)
NITRITE UR QL STRIP.AUTO: NEGATIVE
NRBC # BLD: 0 K/UL (ref 0–0.01)
NRBC BLD-RTO: 0 PER 100 WBC
PH UR STRIP: 7.5 [PH] (ref 5–8)
PLATELET # BLD AUTO: 548 K/UL (ref 150–400)
PMV BLD AUTO: 9.5 FL (ref 8.9–12.9)
POTASSIUM BLD-SCNC: 3.6 MMOL/L (ref 3.5–5.1)
PROT UR STRIP-MCNC: NEGATIVE MG/DL
RBC # BLD AUTO: 3.69 M/UL (ref 3.8–5.2)
RBC #/AREA URNS HPF: ABNORMAL /HPF (ref 0–5)
SAMPLES BEING HELD,HOLD: NORMAL
SERVICE CMNT-IMP: ABNORMAL
SODIUM BLD-SCNC: 142 MMOL/L (ref 136–145)
SP GR UR REFRACTOMETRY: 1.01 (ref 1–1.03)
UR CULT HOLD, URHOLD: NORMAL
UROBILINOGEN UR QL STRIP.AUTO: 0.2 EU/DL (ref 0.2–1)
WBC # BLD AUTO: 8.5 K/UL (ref 3.6–11)
WBC URNS QL MICRO: ABNORMAL /HPF (ref 0–4)

## 2019-01-27 PROCEDURE — 80047 BASIC METABLC PNL IONIZED CA: CPT

## 2019-01-27 PROCEDURE — 85027 COMPLETE CBC AUTOMATED: CPT

## 2019-01-27 PROCEDURE — 99284 EMERGENCY DEPT VISIT MOD MDM: CPT

## 2019-01-27 PROCEDURE — 76856 US EXAM PELVIC COMPLETE: CPT

## 2019-01-27 PROCEDURE — 76830 TRANSVAGINAL US NON-OB: CPT

## 2019-01-27 PROCEDURE — 36415 COLL VENOUS BLD VENIPUNCTURE: CPT

## 2019-01-27 PROCEDURE — 81001 URINALYSIS AUTO W/SCOPE: CPT

## 2019-01-27 RX ORDER — LANOLIN ALCOHOL/MO/W.PET/CERES
325 CREAM (GRAM) TOPICAL
Qty: 30 TAB | Refills: 0 | Status: SHIPPED | OUTPATIENT
Start: 2019-01-27 | End: 2019-01-27

## 2019-01-27 RX ORDER — LANOLIN ALCOHOL/MO/W.PET/CERES
325 CREAM (GRAM) TOPICAL
Qty: 30 TAB | Refills: 0 | Status: SHIPPED | OUTPATIENT
Start: 2019-01-27 | End: 2019-02-26

## 2019-01-27 RX ORDER — DOCUSATE SODIUM 100 MG/1
100 CAPSULE, LIQUID FILLED ORAL 2 TIMES DAILY
Qty: 30 CAP | Refills: 0 | Status: SHIPPED | OUTPATIENT
Start: 2019-01-27 | End: 2019-02-14 | Stop reason: SDUPTHER

## 2019-01-27 RX ORDER — DOCUSATE SODIUM 100 MG/1
100 CAPSULE, LIQUID FILLED ORAL 2 TIMES DAILY
Qty: 30 CAP | Refills: 0 | Status: SHIPPED | OUTPATIENT
Start: 2019-01-27 | End: 2019-01-27

## 2019-01-27 NOTE — DISCHARGE INSTRUCTIONS
Patient Education        Vaginal Bleeding After Menopause: Care Instructions  Your Care Instructions    Vaginal bleeding after menopause can have many causes. Causes may include infection, inflammation, prescription hormones, abnormal growths, and injury. Your doctor may want you to have more tests to find the cause of your vaginal bleeding. Follow-up care is a key part of your treatment and safety. Be sure to make and go to all appointments, and call your doctor if you are having problems. It's also a good idea to know your test results and keep a list of the medicines you take. How can you care for yourself at home? · If your doctor gave you medicine, take it exactly as prescribed. Call your doctor if you think you are having a problem with your medicine. · Do not have sex or put anything inside your vagina until you talk with your doctor. · Do not douche. When should you call for help? Call 911 anytime you think you may need emergency care. For example, call if:    · You passed out (lost consciousness).    Call your doctor now or seek immediate medical care if:    · You have severe vaginal bleeding.     · You are dizzy or lightheaded, or you feel like you may faint.     · You have new or worse belly or pelvic pain.    Watch closely for changes in your health, and be sure to contact your doctor if:    · Your bleeding gets worse.     · You think you might be pregnant.     · You do not get better as expected. Where can you learn more? Go to http://myrna-lexi.info/. Enter N304 in the search box to learn more about \"Vaginal Bleeding After Menopause: Care Instructions. \"  Current as of: May 14, 2018  Content Version: 11.9  © 7648-5771 [employer]. Care instructions adapted under license by [employer] (which disclaims liability or warranty for this information).  If you have questions about a medical condition or this instruction, always ask your healthcare professional. [address] any warranty or liability for your use of this information.

## 2019-01-27 NOTE — ED PROVIDER NOTES
Kizzy Garcia is a 76 y.o. female with PMH of HTN, hypothyroidism, anxiety on Plavix presents to emergency room ambulatory for evaluation of possible vaginal spotting \"in my underwear\" upon waking this morning and fatigue x 1 month. States no incontinence of urine or stool. Last BM was after waking up this morning and was regular with no blood noted. No hx of hysterectomy. She notes having a olmedo for her left femoral endarterectomy 12/10/18 (Dr. Clinton Rowan) and had 2 days of spotting on her pantyliner post-op but was told that was \"normal after a bladder cath\" and had resolved until today. She denies pain at her previous op site. No recent F/C, N/V/D, CP, SOB, ABD pain, constipation. She takes Plavix and ASA. PCP: Kemal Shelley MD 
Uro-GYN: Eliza Lofton, saw her 2 years ago due to urinary incontinence then Surgical hx- see chart Social hx- former smoker, occ ETOH The patient has no other complaints at this time. Past Medical History:  
Diagnosis Date  Hypertension  Psychiatric disorder   
 anxiety  Thyroid disease Hypothyroid Past Surgical History:  
Procedure Laterality Date  HX GYN    
 tubal ligation  HX HEENT  2003  
 embolization for nosebleed  HX TONSILLECTOMY    
 age 24  
[address] Left 2015 L THR  VASCULAR SURGERY PROCEDURE UNLIST  2003  
 temproal artery biopsy  VASCULAR SURGERY PROCEDURE UNLIST  1363  
 stents (umbilical area); Dr. Vail Servant Family History:  
Problem Relation Age of Onset  Heart Failure Mother  COPD Mother  Alcohol abuse Father  Tuberculosis Father  Liver Disease Brother  No Known Problems Brother Social History Socioeconomic History  Marital status:  Spouse name: Not on file  Number of children: Not on file  Years of education: Not on file  Highest education level: Not on file Social Needs  Financial resource strain: Not on file  Food insecurity - worry: Not on file  Food insecurity - inability: Not on file  Transportation needs - medical: Not on file  Transportation needs - non-medical: Not on file Occupational History  Not on file Tobacco Use  Smoking status: Former Smoker Packs/day: 0.50 Years: 20.00 Pack years: 10.00 Last attempt to quit: 5/26/2003 Years since quitting: 15.6  Smokeless tobacco: Never Used Substance and Sexual Activity  Alcohol use: Yes Comment: Occ  Drug use: No  
 Sexual activity: Not on file Other Topics Concern  Not on file Social History Narrative  Not on file ALLERGIES: Patient has no known allergies. Review of Systems Constitutional: Negative. Negative for activity change, chills, fatigue and unexpected weight change. HENT: Negative for trouble swallowing. Respiratory: Negative for cough, chest tightness, shortness of breath and wheezing. Cardiovascular: Negative. Negative for chest pain and palpitations. Gastrointestinal: Negative. Negative for abdominal pain, diarrhea, nausea and vomiting. Genitourinary: Positive for vaginal bleeding. Negative for dysuria, flank pain, frequency and hematuria. Musculoskeletal: Negative. Negative for arthralgias, back pain, neck pain and neck stiffness. Skin: Negative. Negative for color change and rash. Neurological: Negative. Negative for dizziness, numbness and headaches. All other systems reviewed and are negative. Vitals:  
 01/27/19 1411 01/27/19 1434 BP:  124/74 Pulse: 96 (!) 103 Resp:  15 Temp:  98.2 °F (36.8 °C) SpO2: 99% Weight:  85.7 kg (189 lb) Height:  5' 2\" (1.575 m) Physical Exam  
Constitutional: She is oriented to person, place, and time. She appears well-developed and well-nourished. She is active. Non-toxic appearance. No distress. HENT:  
Head: Normocephalic and atraumatic. Eyes: Conjunctivae are normal. Pupils are equal, round, and reactive to light. Right eye exhibits no discharge. Left eye exhibits no discharge. Neck: Normal range of motion and full passive range of motion without pain. No tracheal tenderness present. Cardiovascular: Normal rate, regular rhythm, normal heart sounds, intact distal pulses and normal pulses. Exam reveals no gallop and no friction rub. No murmur heard. Pulmonary/Chest: Effort normal and breath sounds normal. No respiratory distress. She has no wheezes. She has no rales. She exhibits no tenderness. Abdominal: Soft. Bowel sounds are normal. She exhibits no distension. There is no tenderness. There is no rebound and no guarding. Genitourinary:  
Genitourinary Comments: Normal external genitalia. Vagina- pink, moist without lesion. Cervix- pink, round with a closed cervical os. Scant brown discharge. Did not tolerate exam well, bimanual deferred. Musculoskeletal: Normal range of motion. She exhibits no edema or tenderness. Neurological: She is alert and oriented to person, place, and time. She has normal strength. No cranial nerve deficit or sensory deficit. Coordination normal.  
Skin: Skin is warm, dry and intact. Capillary refill takes less than 2 seconds. No abrasion and no rash noted. She is not diaphoretic. No erythema. Psychiatric: She has a normal mood and affect. Her speech is normal and behavior is normal. Cognition and memory are normal.  
Nursing note and vitals reviewed. MDM Number of Diagnoses or Management Options Anemia, unspecified type:  
Vaginal bleeding:  
Diagnosis management comments:  
Ddx: thickened endometrium, fibroid uterus, UTI, electrolyte abnormality, anemia Amount and/or Complexity of Data Reviewed Clinical lab tests: ordered and reviewed Review and summarize past medical records: yes Discuss the patient with other providers: yes Patient Progress Patient progress: stable Procedures I discussed patient's PMH, exam findings as well as careplan with the ER attending who agrees with care plan. Tara Neves PA-C Procedure Note - Pelvic Exam:   
3:14 PM 
Performed by: Tara Neves PA-C Chaperoned by: Sreedhar Steward RN Pelvic exam was performed using bimanual and speculum. Further findings noted in physical exam.  
The procedure took 1-15 minutes, and pt tolerated moderately. Patient with c/o intermittent vaginal spotting, no signs of UTI, no active bleeding on exam, US reassuring without thickened endometrium noted. She is hemodynamically stable. ABD soft. Plan to f/u outpatient with GYN for further management. hgb dropped from 10.3 to 9.3 and feeling more \"tired\" than usual, instructed to take iron supplements, with food, and with stool softener. She denies rectal bleeding, defers rectal exam, states she had a brown BM this morning that was not painful. Also recommended PCP f/u for further management of the overall fatigue she's had for the past month. Tara Neves PA-C 
 
 
DISCHARGE NOTE: The patient's results have been reviewed with them and/or available family. Patient and/or family verbally conveyed their understanding and agreement of the patient's signs, symptoms, diagnosis, treatment and prognosis and additionally agree to follow up as recommended in the discharge instructions or to return to the Emergency Room should their condition change prior to their follow-up appointment. The patient/family verbally agrees with the care-plan and verbally conveys that all of their questions have been answered. The discharge instructions have also been provided to the patient and/or family with some educational information regarding the patient's diagnosis as well a list of reasons why the patient would want to return to the ER prior to their follow-up appointment, should their condition change. Plan: 
1.  F/U with Dr. Chela Dunn, PCP 
 2. Rx fe sulfate, stool softener 3. Return precautions discussed and advised to return to ER if worse

## 2019-01-27 NOTE — ED TRIAGE NOTES
Pateint reports bright red blood this morning in her underwear when she work up this morning. She reports no issues with this before. She arrives with daughter.

## 2019-02-09 ENCOUNTER — APPOINTMENT (OUTPATIENT)
Dept: CT IMAGING | Age: 74
End: 2019-02-09
Attending: EMERGENCY MEDICINE
Payer: MEDICARE

## 2019-02-09 ENCOUNTER — APPOINTMENT (OUTPATIENT)
Dept: GENERAL RADIOLOGY | Age: 74
End: 2019-02-09
Attending: EMERGENCY MEDICINE
Payer: MEDICARE

## 2019-02-09 ENCOUNTER — HOSPITAL ENCOUNTER (EMERGENCY)
Age: 74
Discharge: HOME | End: 2019-02-09
Attending: EMERGENCY MEDICINE
Payer: MEDICARE

## 2019-02-09 VITALS
HEIGHT: 61 IN | SYSTOLIC BLOOD PRESSURE: 124 MMHG | OXYGEN SATURATION: 96 % | WEIGHT: 199.52 LBS | HEART RATE: 82 BPM | TEMPERATURE: 98 F | DIASTOLIC BLOOD PRESSURE: 52 MMHG | BODY MASS INDEX: 37.67 KG/M2 | RESPIRATION RATE: 14 BRPM

## 2019-02-09 DIAGNOSIS — S87.81XA: Primary | ICD-10-CM

## 2019-02-09 LAB
ALBUMIN SERPL-MCNC: 3.4 G/DL (ref 3.5–5)
ALBUMIN/GLOB SERPL: 0.8 {RATIO} (ref 1.1–2.2)
ALP SERPL-CCNC: 131 U/L (ref 45–117)
ALT SERPL-CCNC: 18 U/L (ref 12–78)
ANION GAP SERPL CALC-SCNC: 8 MMOL/L (ref 5–15)
AST SERPL-CCNC: 16 U/L (ref 15–37)
BASOPHILS # BLD: 0 K/UL (ref 0–0.1)
BASOPHILS NFR BLD: 0 % (ref 0–1)
BILIRUB SERPL-MCNC: 0.3 MG/DL (ref 0.2–1)
BUN SERPL-MCNC: 20 MG/DL (ref 6–20)
BUN/CREAT SERPL: 19 (ref 12–20)
CALCIUM SERPL-MCNC: 9.1 MG/DL (ref 8.5–10.1)
CHLORIDE SERPL-SCNC: 107 MMOL/L (ref 97–108)
CK MB CFR SERPL CALC: NORMAL % (ref 0–2.5)
CK MB SERPL-MCNC: <1 NG/ML (ref 5–25)
CK SERPL-CCNC: 47 U/L (ref 26–192)
CO2 SERPL-SCNC: 25 MMOL/L (ref 21–32)
CREAT SERPL-MCNC: 1.04 MG/DL (ref 0.55–1.02)
DIFFERENTIAL METHOD BLD: ABNORMAL
EOSINOPHIL # BLD: 0.3 K/UL (ref 0–0.4)
EOSINOPHIL NFR BLD: 2 % (ref 0–7)
ERYTHROCYTE [DISTWIDTH] IN BLOOD BY AUTOMATED COUNT: 17 % (ref 11.5–14.5)
GLOBULIN SER CALC-MCNC: 4.2 G/DL (ref 2–4)
GLUCOSE BLD STRIP.AUTO-MCNC: 116 MG/DL (ref 65–100)
GLUCOSE SERPL-MCNC: 99 MG/DL (ref 65–100)
HCT VFR BLD AUTO: 29.4 % (ref 35–47)
HGB BLD-MCNC: 8.5 G/DL (ref 11.5–16)
IMM GRANULOCYTES # BLD AUTO: 0.2 K/UL (ref 0–0.04)
IMM GRANULOCYTES NFR BLD AUTO: 1 % (ref 0–0.5)
LYMPHOCYTES # BLD: 1.2 K/UL (ref 0.8–3.5)
LYMPHOCYTES NFR BLD: 8 % (ref 12–49)
MCH RBC QN AUTO: 24.7 PG (ref 26–34)
MCHC RBC AUTO-ENTMCNC: 28.9 G/DL (ref 30–36.5)
MCV RBC AUTO: 85.5 FL (ref 80–99)
MONOCYTES # BLD: 0.8 K/UL (ref 0–1)
MONOCYTES NFR BLD: 5 % (ref 5–13)
NEUTS SEG # BLD: 12.8 K/UL (ref 1.8–8)
NEUTS SEG NFR BLD: 84 % (ref 32–75)
NRBC # BLD: 0 K/UL (ref 0–0.01)
NRBC BLD-RTO: 0 PER 100 WBC
PLATELET # BLD AUTO: 470 K/UL (ref 150–400)
PMV BLD AUTO: 9.5 FL (ref 8.9–12.9)
POTASSIUM SERPL-SCNC: 3.8 MMOL/L (ref 3.5–5.1)
PROT SERPL-MCNC: 7.6 G/DL (ref 6.4–8.2)
RBC # BLD AUTO: 3.44 M/UL (ref 3.8–5.2)
RBC MORPH BLD: ABNORMAL
SERVICE CMNT-IMP: ABNORMAL
SODIUM SERPL-SCNC: 140 MMOL/L (ref 136–145)
WBC # BLD AUTO: 15.3 K/UL (ref 3.6–11)

## 2019-02-09 PROCEDURE — 82550 ASSAY OF CK (CPK): CPT

## 2019-02-09 PROCEDURE — 85025 COMPLETE CBC W/AUTO DIFF WBC: CPT

## 2019-02-09 PROCEDURE — 96374 THER/PROPH/DIAG INJ IV PUSH: CPT

## 2019-02-09 PROCEDURE — 99284 EMERGENCY DEPT VISIT MOD MDM: CPT

## 2019-02-09 PROCEDURE — 82962 GLUCOSE BLOOD TEST: CPT

## 2019-02-09 PROCEDURE — 74011636320 HC RX REV CODE- 636/320: Performed by: RADIOLOGY

## 2019-02-09 PROCEDURE — 73706 CT ANGIO LWR EXTR W/O&W/DYE: CPT

## 2019-02-09 PROCEDURE — 74011250636 HC RX REV CODE- 250/636: Performed by: EMERGENCY MEDICINE

## 2019-02-09 PROCEDURE — 74011000258 HC RX REV CODE- 258: Performed by: RADIOLOGY

## 2019-02-09 PROCEDURE — 80053 COMPREHEN METABOLIC PANEL: CPT

## 2019-02-09 PROCEDURE — 73630 X-RAY EXAM OF FOOT: CPT

## 2019-02-09 PROCEDURE — 36415 COLL VENOUS BLD VENIPUNCTURE: CPT

## 2019-02-09 PROCEDURE — 73590 X-RAY EXAM OF LOWER LEG: CPT

## 2019-02-09 RX ORDER — SODIUM CHLORIDE 0.9 % (FLUSH) 0.9 %
10 SYRINGE (ML) INJECTION
Status: COMPLETED | OUTPATIENT
Start: 2019-02-09 | End: 2019-02-09

## 2019-02-09 RX ORDER — MORPHINE SULFATE 2 MG/ML
4 INJECTION, SOLUTION INTRAMUSCULAR; INTRAVENOUS ONCE
Status: COMPLETED | OUTPATIENT
Start: 2019-02-09 | End: 2019-02-09

## 2019-02-09 RX ORDER — HYDROCODONE BITARTRATE AND ACETAMINOPHEN 5; 325 MG/1; MG/1
1-2 TABLET ORAL
Qty: 15 TAB | Refills: 0 | Status: SHIPPED | OUTPATIENT
Start: 2019-02-09 | End: 2019-02-15

## 2019-02-09 RX ADMIN — IOPAMIDOL 100 ML: 755 INJECTION, SOLUTION INTRAVENOUS at 15:45

## 2019-02-09 RX ADMIN — Medication 10 ML: at 15:45

## 2019-02-09 RX ADMIN — MORPHINE SULFATE 4 MG: 2 INJECTION, SOLUTION INTRAMUSCULAR; INTRAVENOUS at 14:56

## 2019-02-09 RX ADMIN — SODIUM CHLORIDE 100 ML: 900 INJECTION, SOLUTION INTRAVENOUS at 15:45
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
## 2019-02-09 NOTE — DISCHARGE INSTRUCTIONS
Patient Education      return for severe pain  Expect bruising over next week or two to leg/ankle and foot. Learning About RICE (Rest, Ice, Compression, and Elevation)  What is RICE? RICE is a way to care for an injury. RICE helps relieve pain and swelling. It may also help with healing and flexibility. RICE stands for:  · Rest and protect the injured or sore area. · Ice or a cold pack used as soon as possible. · Compression, or wrapping the injured or sore area with an elastic bandage. · Elevation (propping up) the injured or sore area. How do you do RICE? You can use RICE for home treatment when you have general aches and pains or after an injury or surgery. Rest  · Do not put weight on the injury for at least 24 to 48 hours. · Use crutches for a badly sprained knee or ankle. · Support a sprained wrist, elbow, or shoulder with a sling. Ice  · Put ice or a cold pack on the injury right away to reduce pain and swelling. Frozen vegetables will also work as an ice pack. Put a thin cloth between the ice or cold pack and your skin. The cloth protects the injured area from getting too cold. · Use ice for 10 to 15 minutes at a time for the first 48 to 72 hours. Compression  · Use compression for sprains, strains, and surgeries of the arms and legs. · Wrap the injured area with an elastic bandage or compression sleeve to reduce swelling. · Don't wrap it too tightly. If the area below it feels numb, tingles, or feels cool, loosen the wrap. Elevation  · Use elevation for areas of the body that can be propped up, such as arms and legs. · Prop up the injured area on pillows whenever you use ice. Keep it propped up anytime you sit or lie down. · Try to keep the injured area at or above the level of your heart. This will help reduce swelling and bruising. Where can you learn more? Go to http://myrna-lexi.info/.   Enter X404 in the search box to learn more about \"Learning About RICE (Rest, Ice, Compression, and Elevation). \"  Current as of: September 20, 2018  Content Version: 11.9  © 1598-8898 [employer]. Care instructions adapted under license by [employer] (which disclaims liability or warranty for this information). If you have questions about a medical condition or this instruction, always ask your healthcare professional. [address] any warranty or liability for your use of this information.

## 2019-02-09 NOTE — ED NOTES
Change of shift. Care of patient taken over from Dr. Janet Whiting; H&P reviewed, bedside handoff complete. Awaiting CTA. Note written by Silvana Mcpherson, as dictated by Mariza Real DO 2:30 PM 
 
CONSULT NOTE: 
4:46 PM Mariza Real DO spoke with Dr. Gael Rowan, Consult for Vascular Surgery. Discussed preliminary CTA results. Dr. Gael Rowan recommends Pt can go home, he will see Pt as needed. PROGRESS NOTE: 
5:25 PM 
Mariza Real DO reviewed results w/ Pt. She is now feeling better. Will give Pt crutches and place R ankle in an air cast. Discussed compartment syndrome and its rare occurrence, she will look out for those sx. Pt can buy an Ace wrap at a drug store if needed for R leg swelling. Will discharge Pt home w/ Norco which she has taken in the past.  
 
Pt almost passed out. Has not eaten since this morning. Felt better after some water and food. DC home.

## 2019-02-09 NOTE — ED NOTES
Pt ambulated a few steps x 2 assist. Pt states she feels weak, light-headed and like she might pass out. MD aware. Pt given crackers and water.

## 2019-02-11 NOTE — CALL BACK NOTE
[facility] Emergency Department Follow Up Call Record Discharged to : Home/Family Home/Home Health/Skilled Facility/Rehab/Assisted Living/Other__Home_____ 1) Did you receive your discharge instructions? Yes This writer spoke with Beto Baez who reports doing well today, is using ice packs to leg as directed, and elevating. 2) Do you understand them? Yes        
3) Are you able to follow them? Yes If NO, what can I clarify for you? 4) Do you understand your diagnosis? Yes        
5) Do you know which symptoms should prompt you to call the doctor? Yes    
6) Were you able to fill and  any medications that were prescribed? Yes  
 
7) You were prescribed __Norco_________for __leg pain__________________. Common side effects of this medication are____rash, drowsiness, constipation________________. This is not a complete list so please review the forms given from the pharmacy for a complete list. 8) Are there any questions about your medications? No     
 
  
 Have you scheduled any recommended doctors appointments (specialty, PCP) YES. Appointment on Wed. 2/13/19. If NO, what barriers are you encountering (transportation/lost contact info/cost/ 
didnt think necessary/no PCP 
9) If discharged with Home Health, has the agency contacted you to schedule visit? Not applicable 10) Is there anyone available to help you at home (meals, errands, transportation   
monitoring) (adult children, neighbors, private duty companions) Yes, , family 11) Are you on a special diet? No        
If YES, do you understand the requirements for this diet? Education provided? 12) If presented with cough, bronchitis, COPD, asthma, is it ok to ask that the 
 respiratory disease management educator call you? Refused 13)  A) If presented with fall, were you issued an assistive device in the ED Are you using? Yes Patient in air cast and using crutches B) If given RX for device, have you obtained? Yes If NO, barriers? C) Therapist recommended:NO Are you able to implement the suggestions? Not applicable If NO, barriers to implementation? D) Are you having any difficulties with mobility inside your home?   
 (steps, bed, tub)Yes, but receives help and assistance by family members. Able to partically weight bear on her affected leg. If YES, ask if the [facility] PT can contact patient and good time and number? 
14)  At the end of your discharge instructions, there is information about accessing [facility], have you had a chance to review those? Yes Do you have any questions about signing up for this service? We encourage our patients to be active participants in their healthcare and this site is one of the ways to do that. It will allow you to access parts of your medical record, email your doctors office, schedule appointments, and request medications refills . 15) Are there any other questions that I can answer for you regarding  
 your Emergency department visit? NO Estimated Call Time:___2:35 PM 
________________ Date/Time:_______________

## 2019-02-14 ENCOUNTER — APPOINTMENT (OUTPATIENT)
Dept: VASCULAR SURGERY | Age: 74
End: 2019-02-14
Attending: EMERGENCY MEDICINE
Payer: MEDICARE

## 2019-02-14 ENCOUNTER — APPOINTMENT (OUTPATIENT)
Dept: CT IMAGING | Age: 74
End: 2019-02-14
Attending: EMERGENCY MEDICINE
Payer: MEDICARE

## 2019-02-14 ENCOUNTER — HOSPITAL ENCOUNTER (OUTPATIENT)
Age: 74
Setting detail: OBSERVATION
Discharge: HOME HEALTH | End: 2019-02-15
Attending: EMERGENCY MEDICINE | Admitting: INTERNAL MEDICINE
Payer: MEDICARE

## 2019-02-14 DIAGNOSIS — S90.31XD CONTUSION OF RIGHT FOOT, SUBSEQUENT ENCOUNTER: ICD-10-CM

## 2019-02-14 DIAGNOSIS — M25.561 ARTHRALGIA OF RIGHT LOWER LEG: ICD-10-CM

## 2019-02-14 DIAGNOSIS — S87.81XA: ICD-10-CM

## 2019-02-14 DIAGNOSIS — D64.9 ANEMIA, UNSPECIFIED TYPE: Primary | ICD-10-CM

## 2019-02-14 PROBLEM — M10.9 GOUT FLARE: Status: ACTIVE | Noted: 2019-02-14

## 2019-02-14 PROBLEM — S80.11XA TRAUMATIC HEMATOMA OF RIGHT LOWER LEG: Status: ACTIVE | Noted: 2019-02-14

## 2019-02-14 PROBLEM — M79.604 ACUTE LEG PAIN, RIGHT: Status: ACTIVE | Noted: 2019-02-14

## 2019-02-14 PROBLEM — R27.8 UNABLE TO BALANCE WHEN STANDING: Status: ACTIVE | Noted: 2019-02-14

## 2019-02-14 LAB
ALBUMIN SERPL-MCNC: 3.4 G/DL (ref 3.5–5)
ALBUMIN/GLOB SERPL: 0.7 {RATIO} (ref 1.1–2.2)
ALP SERPL-CCNC: 141 U/L (ref 45–117)
ALT SERPL-CCNC: 17 U/L (ref 12–78)
ANION GAP SERPL CALC-SCNC: 10 MMOL/L (ref 5–15)
ANION GAP SERPL CALC-SCNC: 9 MMOL/L (ref 5–15)
APTT PPP: 24.5 SEC (ref 22.1–32)
AST SERPL-CCNC: 29 U/L (ref 15–37)
BASOPHILS # BLD: 0.1 K/UL (ref 0–0.1)
BASOPHILS NFR BLD: 1 % (ref 0–1)
BILIRUB SERPL-MCNC: 0.4 MG/DL (ref 0.2–1)
BUN SERPL-MCNC: 18 MG/DL (ref 6–20)
BUN SERPL-MCNC: 24 MG/DL (ref 6–20)
BUN/CREAT SERPL: 19 (ref 12–20)
BUN/CREAT SERPL: 21 (ref 12–20)
CALCIUM SERPL-MCNC: 9.7 MG/DL (ref 8.5–10.1)
CALCIUM SERPL-MCNC: 9.8 MG/DL (ref 8.5–10.1)
CHLORIDE SERPL-SCNC: 102 MMOL/L (ref 97–108)
CHLORIDE SERPL-SCNC: 103 MMOL/L (ref 97–108)
CO2 SERPL-SCNC: 22 MMOL/L (ref 21–32)
CO2 SERPL-SCNC: 26 MMOL/L (ref 21–32)
CREAT SERPL-MCNC: 0.96 MG/DL (ref 0.55–1.02)
CREAT SERPL-MCNC: 1.15 MG/DL (ref 0.55–1.02)
DIFFERENTIAL METHOD BLD: ABNORMAL
EOSINOPHIL # BLD: 0.3 K/UL (ref 0–0.4)
EOSINOPHIL NFR BLD: 3 % (ref 0–7)
ERYTHROCYTE [DISTWIDTH] IN BLOOD BY AUTOMATED COUNT: 17.8 % (ref 11.5–14.5)
GLOBULIN SER CALC-MCNC: 5.1 G/DL (ref 2–4)
GLUCOSE SERPL-MCNC: 86 MG/DL (ref 65–100)
GLUCOSE SERPL-MCNC: 98 MG/DL (ref 65–100)
HCT VFR BLD AUTO: 28.8 % (ref 35–47)
HGB BLD-MCNC: 8.1 G/DL (ref 11.5–16)
IMM GRANULOCYTES # BLD AUTO: 0.1 K/UL (ref 0–0.04)
IMM GRANULOCYTES NFR BLD AUTO: 1 % (ref 0–0.5)
INR PPP: 1 (ref 0.9–1.1)
LYMPHOCYTES # BLD: 1.7 K/UL (ref 0.8–3.5)
LYMPHOCYTES NFR BLD: 18 % (ref 12–49)
MCH RBC QN AUTO: 24.1 PG (ref 26–34)
MCHC RBC AUTO-ENTMCNC: 28.1 G/DL (ref 30–36.5)
MCV RBC AUTO: 85.7 FL (ref 80–99)
MONOCYTES # BLD: 0.7 K/UL (ref 0–1)
MONOCYTES NFR BLD: 8 % (ref 5–13)
NEUTS SEG # BLD: 6.3 K/UL (ref 1.8–8)
NEUTS SEG NFR BLD: 69 % (ref 32–75)
NRBC # BLD: 0 K/UL (ref 0–0.01)
NRBC BLD-RTO: 0 PER 100 WBC
PLATELET # BLD AUTO: 557 K/UL (ref 150–400)
PMV BLD AUTO: 10.1 FL (ref 8.9–12.9)
POTASSIUM SERPL-SCNC: 3.4 MMOL/L (ref 3.5–5.1)
POTASSIUM SERPL-SCNC: 5.4 MMOL/L (ref 3.5–5.1)
PROT SERPL-MCNC: 8.5 G/DL (ref 6.4–8.2)
PROTHROMBIN TIME: 10.3 SEC (ref 9–11.1)
RBC # BLD AUTO: 3.36 M/UL (ref 3.8–5.2)
RBC MORPH BLD: ABNORMAL
SODIUM SERPL-SCNC: 134 MMOL/L (ref 136–145)
SODIUM SERPL-SCNC: 138 MMOL/L (ref 136–145)
THERAPEUTIC RANGE,PTTT: NORMAL SECS (ref 58–77)
URATE SERPL-MCNC: 10.7 MG/DL (ref 2.6–6)
WBC # BLD AUTO: 9.2 K/UL (ref 3.6–11)

## 2019-02-14 PROCEDURE — 99285 EMERGENCY DEPT VISIT HI MDM: CPT

## 2019-02-14 PROCEDURE — 85610 PROTHROMBIN TIME: CPT

## 2019-02-14 PROCEDURE — 84550 ASSAY OF BLOOD/URIC ACID: CPT

## 2019-02-14 PROCEDURE — 85730 THROMBOPLASTIN TIME PARTIAL: CPT

## 2019-02-14 PROCEDURE — 80053 COMPREHEN METABOLIC PANEL: CPT

## 2019-02-14 PROCEDURE — 65270000029 HC RM PRIVATE

## 2019-02-14 PROCEDURE — 65390000012 HC CONDITION CODE 44 OBSERVATION

## 2019-02-14 PROCEDURE — 93971 EXTREMITY STUDY: CPT

## 2019-02-14 PROCEDURE — 94761 N-INVAS EAR/PLS OXIMETRY MLT: CPT

## 2019-02-14 PROCEDURE — 73700 CT LOWER EXTREMITY W/O DYE: CPT

## 2019-02-14 PROCEDURE — 93005 ELECTROCARDIOGRAM TRACING: CPT

## 2019-02-14 PROCEDURE — 85025 COMPLETE CBC W/AUTO DIFF WBC: CPT

## 2019-02-14 PROCEDURE — 36415 COLL VENOUS BLD VENIPUNCTURE: CPT

## 2019-02-14 PROCEDURE — 74011250637 HC RX REV CODE- 250/637: Performed by: EMERGENCY MEDICINE

## 2019-02-14 RX ORDER — OXYCODONE AND ACETAMINOPHEN 5; 325 MG/1; MG/1
1 TABLET ORAL
Status: COMPLETED | OUTPATIENT
Start: 2019-02-14 | End: 2019-02-14

## 2019-02-14 RX ORDER — AMLODIPINE BESYLATE 5 MG/1
5 TABLET ORAL DAILY
Status: DISCONTINUED | OUTPATIENT
Start: 2019-02-15 | End: 2019-02-15 | Stop reason: HOSPADM

## 2019-02-14 RX ORDER — DOCUSATE SODIUM 100 MG/1
100 CAPSULE, LIQUID FILLED ORAL DAILY
COMMUNITY

## 2019-02-14 RX ORDER — CELECOXIB 200 MG/1
200 CAPSULE ORAL DAILY
COMMUNITY

## 2019-02-14 RX ORDER — SODIUM CHLORIDE 0.9 % (FLUSH) 0.9 %
5-40 SYRINGE (ML) INJECTION AS NEEDED
Status: DISCONTINUED | OUTPATIENT
Start: 2019-02-14 | End: 2019-02-15 | Stop reason: HOSPADM

## 2019-02-14 RX ORDER — LANOLIN ALCOHOL/MO/W.PET/CERES
325 CREAM (GRAM) TOPICAL
Status: DISCONTINUED | OUTPATIENT
Start: 2019-02-15 | End: 2019-02-15 | Stop reason: HOSPADM

## 2019-02-14 RX ORDER — CITALOPRAM 20 MG/1
20 TABLET, FILM COATED ORAL
Status: DISCONTINUED | OUTPATIENT
Start: 2019-02-15 | End: 2019-02-15 | Stop reason: HOSPADM

## 2019-02-14 RX ORDER — SODIUM CHLORIDE 0.9 % (FLUSH) 0.9 %
5-40 SYRINGE (ML) INJECTION EVERY 8 HOURS
Status: DISCONTINUED | OUTPATIENT
Start: 2019-02-15 | End: 2019-02-15 | Stop reason: HOSPADM

## 2019-02-14 RX ORDER — LEVOTHYROXINE SODIUM 50 UG/1
50 TABLET ORAL
Status: DISCONTINUED | OUTPATIENT
Start: 2019-02-15 | End: 2019-02-15 | Stop reason: HOSPADM

## 2019-02-14 RX ORDER — GUAIFENESIN 100 MG/5ML
81 LIQUID (ML) ORAL DAILY
Status: DISCONTINUED | OUTPATIENT
Start: 2019-02-15 | End: 2019-02-15 | Stop reason: HOSPADM

## 2019-02-14 RX ORDER — DOCUSATE SODIUM 100 MG/1
100 CAPSULE, LIQUID FILLED ORAL DAILY
Status: DISCONTINUED | OUTPATIENT
Start: 2019-02-15 | End: 2019-02-15 | Stop reason: HOSPADM

## 2019-02-14 RX ORDER — TRIAMTERENE/HYDROCHLOROTHIAZID 37.5-25 MG
1 TABLET ORAL DAILY
Status: DISCONTINUED | OUTPATIENT
Start: 2019-02-15 | End: 2019-02-15 | Stop reason: HOSPADM

## 2019-02-14 RX ADMIN — OXYCODONE AND ACETAMINOPHEN 1 TABLET: 5; 325 TABLET ORAL at 15:55
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
## 2019-02-14 NOTE — PROGRESS NOTES
Admission Medication Reconciliation: 
 
Information obtained from:  patient, patient's medication list, [id number] Comments/Recommendations:  
 
Spoke with patient regarding Ines Mohr's medications. She had a list of medications with her, and could speak to the other medications she is taking that were not listed. Doses of those additional medications were confirmed with [facility]. The following changes were made to the PTA medication list: 1. Removed clopidogrel (stopped in the last week) 2. Adjusted docusate BID --> daily Allergies and reactions were verified with the patient. Patient's pharmacy: [facility] on [address] Allergies:  Patient has no known allergies. Chief Complaint for this Admission: Chief Complaint Patient presents with  Foot Pain Significant PMH/Disease States:  
Past Medical History:  
Diagnosis Date  Hypertension  Psychiatric disorder   
 anxiety  Thyroid disease Hypothyroid Prior to Admission Medications:  
Prior to Admission Medications Prescriptions Last Dose Informant Patient Reported? Taking? HYDROcodone-acetaminophen (NORCO) 5-325 mg per tablet   No No  
Sig: Take 1-2 Tabs by mouth every six (6) hours as needed for Pain. Max Daily Amount: 8 Tabs. amLODIPine (NORVASC) 5 mg tablet 2/14/2019 at Unknown time  Yes Yes Sig: Take 5 mg by mouth daily. aspirin 81 mg chewable tablet 2/14/2019 at Unknown time  Yes Yes Sig: Take 81 mg by mouth daily. celecoxib (CELEBREX) 200 mg capsule 2/14/2019 at Unknown time  Yes Yes Sig: Take 200 mg by mouth daily. citalopram (CELEXA) 20 mg tablet 2/14/2019 at Unknown time  Yes Yes Sig: Take 20 mg by mouth every morning. docusate sodium (COLACE) 100 mg capsule 2/14/2019 at Unknown time  Yes Yes Sig: Take 100 mg by mouth daily. ferrous sulfate 325 mg (65 mg iron) tablet 2/14/2019 at Unknown time  No Yes Sig: Take 1 Tab by mouth Daily (before breakfast) for 30 days. levothyroxine (SYNTHROID) 50 mcg tablet 2/14/2019 at Unknown time  Yes Yes Sig: Take 50 mcg by mouth Daily (before breakfast). triamterene-hydrochlorothiazide (DYAZIDE) 37.5-25 mg per capsule 2/14/2019 at Unknown time  Yes Yes Sig: Take 1 Cap by mouth daily. Facility-Administered Medications: None Thank you for allowing me to participate in this patient's care. Please call the main pharmacy at  or the [facility] pharmacist at  with any questions. Luan Yoo, TaylorD

## 2019-02-14 NOTE — ED TRIAGE NOTES
Pt arrives for right foot pain. Patient was recently seen here for the same complaint. Patient arrives in air splint - states she can't ambulate with it. +Swelling and discoloration noted to right lower extremity

## 2019-02-14 NOTE — PROGRESS NOTES
Contact made w/ local DME provider regarding medical necessity for [facility] will need documentation regarding location of bathroom (inside/outside of home), diagnosis, and inability to ambulate to primary bathroom. Obtained costs of  [facility] $39 / [facility] $59/ and [employer] $34.  
 
Discussed w/ spouse regarding above states familiar w/ providers and would be easier to purchase DME instead of pending authorization and delivery. Choice offered for DME - elects [facility]. Referral placed via [facility]. [place] Letter signed. Updates provided to Vannesa Reed and team met back w/ patient and spouse regarding transitional care options. Hospitalist consulted.

## 2019-02-14 NOTE — PROGRESS NOTES
Date of previous inpatient admission/ ED visit? 2/9/19 ED What brought the patient back to ED? Right leg pain Did patient decline recommended services during last admission/ ED visit (if yes, what)? No 
 
Has patient seen a provider since their last inpatient admission/ED visit (if yes, when)? No 
 
CM Interventions: 
From previous inpatient admission/ED visit: Assessment From current inpatient admission/ED visit: Assessment [facility]/CM consult received and appreciated. EMR reviewed. Case discussed w/ Gogo Connolly and Paris Valente. Met w/patient and spouse - introduced to role of CM. History provided by patient. Mrs. Yumiko Mccurdy and spouse verbalize challenge of mobilizing at home and inability to bear weight (2 story condo w/ steps). Patient states being in communication w/ PCP and Vascular requesting ED evaluation. Informed of history of anemia and patient expressed concern for labs decreasing over past few months. Discuss possible referral to inpatient rehab. Choice offered. Permission obtained for referral to [facility] from the ED. Referral sent via [employer]. [address] Call received from [facility], [facility] Rehab Liaison informed patient does not have a medical rehab diagnosis. PCP is Dr.Richard Rob Huerta [facility] is local pharmacy utilized [facility] Lim) AMD on file. Case Management will continue to follow for transitions of care needs. Care Management Interventions PCP Verified by CM: Yes Last Visit to PCP: 01/14/19 Palliative Care Criteria Met (RRAT>21 & CHF Dx)?: No 
Transition of Care Consult (CM Consult): Discharge Planning MyChart Signup: No 
Discharge Durable Medical Equipment: No 
Health Maintenance Reviewed: Yes Physical Therapy Consult: Yes Occupational Therapy Consult: No 
Speech Therapy Consult: No 
Current Support Network: Lives with Spouse, Own Home Confirm Follow Up Transport: (TBD) Plan discussed with Pt/Family/Caregiver:  Yes 
 [employer] Information Provided?: No 
Discharge Location Discharge Placement: (TBD)

## 2019-02-15 ENCOUNTER — HOME HEALTH ADMISSION (OUTPATIENT)
Dept: HOME HEALTH SERVICES | Facility: HOME HEALTH | Age: 74
End: 2019-02-15
Payer: MEDICARE

## 2019-02-15 VITALS
SYSTOLIC BLOOD PRESSURE: 124 MMHG | OXYGEN SATURATION: 94 % | HEART RATE: 82 BPM | TEMPERATURE: 98.2 F | DIASTOLIC BLOOD PRESSURE: 71 MMHG | RESPIRATION RATE: 16 BRPM

## 2019-02-15 LAB
ANION GAP SERPL CALC-SCNC: 8 MMOL/L (ref 5–15)
ATRIAL RATE: 74 BPM
BASOPHILS # BLD: 0 K/UL (ref 0–0.1)
BASOPHILS NFR BLD: 0 % (ref 0–1)
BUN SERPL-MCNC: 23 MG/DL (ref 6–20)
BUN/CREAT SERPL: 23 (ref 12–20)
CALCIUM SERPL-MCNC: 9.7 MG/DL (ref 8.5–10.1)
CALCULATED P AXIS, ECG09: 55 DEGREES
CALCULATED R AXIS, ECG10: 41 DEGREES
CALCULATED T AXIS, ECG11: 35 DEGREES
CHLORIDE SERPL-SCNC: 104 MMOL/L (ref 97–108)
CO2 SERPL-SCNC: 26 MMOL/L (ref 21–32)
CREAT SERPL-MCNC: 1.01 MG/DL (ref 0.55–1.02)
DIAGNOSIS, 93000: NORMAL
DIFFERENTIAL METHOD BLD: ABNORMAL
EOSINOPHIL # BLD: 0.4 K/UL (ref 0–0.4)
EOSINOPHIL NFR BLD: 4 % (ref 0–7)
ERYTHROCYTE [DISTWIDTH] IN BLOOD BY AUTOMATED COUNT: 17.8 % (ref 11.5–14.5)
GLUCOSE SERPL-MCNC: 99 MG/DL (ref 65–100)
HCT VFR BLD AUTO: 27.4 % (ref 35–47)
HGB BLD-MCNC: 7.8 G/DL (ref 11.5–16)
IMM GRANULOCYTES # BLD AUTO: 0.1 K/UL (ref 0–0.04)
IMM GRANULOCYTES NFR BLD AUTO: 1 % (ref 0–0.5)
LYMPHOCYTES # BLD: 1.2 K/UL (ref 0.8–3.5)
LYMPHOCYTES NFR BLD: 13 % (ref 12–49)
MCH RBC QN AUTO: 24.1 PG (ref 26–34)
MCHC RBC AUTO-ENTMCNC: 28.5 G/DL (ref 30–36.5)
MCV RBC AUTO: 84.8 FL (ref 80–99)
MONOCYTES # BLD: 1 K/UL (ref 0–1)
MONOCYTES NFR BLD: 10 % (ref 5–13)
NEUTS SEG # BLD: 6.8 K/UL (ref 1.8–8)
NEUTS SEG NFR BLD: 72 % (ref 32–75)
NRBC # BLD: 0 K/UL (ref 0–0.01)
NRBC BLD-RTO: 0 PER 100 WBC
P-R INTERVAL, ECG05: 152 MS
PLATELET # BLD AUTO: 572 K/UL (ref 150–400)
PMV BLD AUTO: 9.8 FL (ref 8.9–12.9)
POTASSIUM SERPL-SCNC: 3.6 MMOL/L (ref 3.5–5.1)
Q-T INTERVAL, ECG07: 396 MS
QRS DURATION, ECG06: 94 MS
QTC CALCULATION (BEZET), ECG08: 439 MS
RBC # BLD AUTO: 3.23 M/UL (ref 3.8–5.2)
RBC MORPH BLD: ABNORMAL
RBC MORPH BLD: ABNORMAL
SODIUM SERPL-SCNC: 138 MMOL/L (ref 136–145)
VENTRICULAR RATE, ECG03: 74 BPM
WBC # BLD AUTO: 9.5 K/UL (ref 3.6–11)

## 2019-02-15 PROCEDURE — 85025 COMPLETE CBC W/AUTO DIFF WBC: CPT

## 2019-02-15 PROCEDURE — 99218 HC RM OBSERVATION: CPT

## 2019-02-15 PROCEDURE — 65390000012 HC CONDITION CODE 44 OBSERVATION

## 2019-02-15 PROCEDURE — 74011250637 HC RX REV CODE- 250/637: Performed by: FAMILY MEDICINE

## 2019-02-15 PROCEDURE — 97161 PT EVAL LOW COMPLEX 20 MIN: CPT

## 2019-02-15 PROCEDURE — 36415 COLL VENOUS BLD VENIPUNCTURE: CPT

## 2019-02-15 PROCEDURE — 74011250636 HC RX REV CODE- 250/636: Performed by: FAMILY MEDICINE

## 2019-02-15 PROCEDURE — 97530 THERAPEUTIC ACTIVITIES: CPT

## 2019-02-15 PROCEDURE — 74011636637 HC RX REV CODE- 636/637: Performed by: FAMILY MEDICINE

## 2019-02-15 PROCEDURE — 80048 BASIC METABOLIC PNL TOTAL CA: CPT

## 2019-02-15 PROCEDURE — 97116 GAIT TRAINING THERAPY: CPT

## 2019-02-15 RX ORDER — SODIUM CHLORIDE 9 MG/ML
100 INJECTION, SOLUTION INTRAVENOUS CONTINUOUS
Status: DISCONTINUED | OUTPATIENT
Start: 2019-02-15 | End: 2019-02-15 | Stop reason: HOSPADM

## 2019-02-15 RX ORDER — PREDNISONE 10 MG/1
TABLET ORAL
Qty: 30 TAB | Refills: 0 | Status: SHIPPED | OUTPATIENT
Start: 2019-02-16 | End: 2019-02-28

## 2019-02-15 RX ORDER — OXYCODONE AND ACETAMINOPHEN 5; 325 MG/1; MG/1
1 TABLET ORAL
Qty: 15 TAB | Refills: 0 | Status: SHIPPED | OUTPATIENT
Start: 2019-02-15

## 2019-02-15 RX ORDER — OXYCODONE AND ACETAMINOPHEN 5; 325 MG/1; MG/1
1 TABLET ORAL
Status: DISCONTINUED | OUTPATIENT
Start: 2019-02-15 | End: 2019-02-15 | Stop reason: HOSPADM

## 2019-02-15 RX ORDER — PREDNISONE 20 MG/1
40 TABLET ORAL
Status: DISCONTINUED | OUTPATIENT
Start: 2019-02-15 | End: 2019-02-15 | Stop reason: HOSPADM

## 2019-02-15 RX ADMIN — PREDNISONE 40 MG: 20 TABLET ORAL at 07:30

## 2019-02-15 RX ADMIN — TRIAMTERENE AND HYDROCHLOROTHIAZIDE 1 TABLET: 37.5; 25 TABLET ORAL at 09:38

## 2019-02-15 RX ADMIN — DOCUSATE SODIUM 100 MG: 100 CAPSULE, LIQUID FILLED ORAL at 09:38

## 2019-02-15 RX ADMIN — Medication 10 ML: at 07:31

## 2019-02-15 RX ADMIN — Medication 10 ML: at 09:39

## 2019-02-15 RX ADMIN — CITALOPRAM HYDROBROMIDE 20 MG: 20 TABLET ORAL at 07:30

## 2019-02-15 RX ADMIN — LEVOTHYROXINE SODIUM 50 MCG: 50 TABLET ORAL at 07:30

## 2019-02-15 RX ADMIN — FERROUS SULFATE TAB 325 MG (65 MG ELEMENTAL FE) 325 MG: 325 (65 FE) TAB at 07:30

## 2019-02-15 RX ADMIN — AMLODIPINE BESYLATE 5 MG: 5 TABLET ORAL at 09:38

## 2019-02-15 RX ADMIN — ASPIRIN 81 MG CHEWABLE TABLET 81 MG: 81 TABLET CHEWABLE at 09:38

## 2019-02-15 RX ADMIN — SODIUM CHLORIDE 100 ML/HR: 900 INJECTION, SOLUTION INTRAVENOUS at 09:37
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
## 2019-02-15 NOTE — PROGRESS NOTES
Chart reviewed. CM received notification that patient has been changed to observation status. CM delivered code 40 and observation letter. Patient was provided with copies of each document and signed copies were placed on hard chart. Opportunity for questions and clarification was provided. JAVON ArellanoW/CRM

## 2019-02-15 NOTE — PROGRESS NOTES
Spiritual Care Assessment/Progress Note [address] 
 
 
NAME: Garald Olszewski      MRN: [id number] AGE: 76 y.o. SEX: female Denominational Affiliation: Episcopalian Language: [place] 2/15/2019     Total Time (in minutes): 5 Spiritual Assessment begun in [facility] through conversation with: 
  
    []Patient        [] Family    [] Friend(s) Reason for Consult: Advance medical directive consult Spiritual beliefs: (Please include comment if needed) 
   [] Identifies with a rosmery tradition:     
   [] Supported by a rosmery community:        
   [] Claims no spiritual orientation:       
   [] Seeking spiritual identity:            
   [] Adheres to an individual form of spirituality:       
   [x] Not able to assess:                   
 
    
Identified resources for coping:  
   [] Prayer                           
   [] Music                  [] Guided Imagery 
   [] Family/friends                 [] Pet visits [] Devotional reading                         [x] Unknown 
   [] Other:                                          
 
 
Interventions offered during this visit: (See comments for more details) Plan of Care: 
 
 [] Support spiritual and/or cultural needs  
 [] Support AMD and/or advance care planning process    
 [] Support grieving process 
 [] Coordinate Rites and/or Rituals  
 [] Coordination with community clergy [] No spiritual needs identified at this time 
 [] Detailed Plan of Care below (See Comments)  [] Make referral to Music Therapy 
[] Make referral to Pet Therapy    
[] Make referral to Addiction services 
[] Make referral to [facility] 
[] Make referral to Spiritual Care Partner 
[] No future visits requested       
[x] Follow up visits as needed Comments:  visit for Advance Medical Directive (AMD) consult.  Upon review of the pt's chart, Mrs. Dana Rouse already has a completed AMD.  went to follow up with pt to check, and pt was resting and did not awake to her name being called. Please contact [phone] [address] [facility] for further support.  follow up as needed. Oumar Francisco, MACE 
 [phone])
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
## 2019-02-15 NOTE — PROGRESS NOTES
Primary Nurse Elsa Merrill RN and Paige Toscano RN performed a dual skin assessment on this patient Impairment noted- see wound doc flow sheet Sotero score is 20 Scattered abrasions LAQUITA & CHRISSY.

## 2019-02-15 NOTE — ROUTINE PROCESS
TRANSFER - OUT REPORT: 
 
Verbal report given to Sheldon Villegas RN(name) on [address]  being transferred to [address] [place] [address](unit) for routine progression of care Report consisted of patients Situation, Background, Assessment and  
Recommendations(SBAR). Information from the following report(s) SBAR, ED Summary, Intake/Output, MAR, Recent Results and Cardiac Rhythm NSR was reviewed with the receiving nurse. Lines:  
Peripheral IV 02/14/19 Right Forearm (Active) Site Assessment Clean, dry, & intact 2/14/2019  1:07 PM  
Phlebitis Assessment 0 2/14/2019  1:07 PM  
Infiltration Assessment 0 2/14/2019  1:07 PM  
Dressing Status Clean, dry, & intact 2/14/2019  1:07 PM  
Dressing Type Transparent 2/14/2019  1:07 PM  
Hub Color/Line Status Blue;Flushed;Patent 2/14/2019  1:07 PM  
Action Taken Blood drawn 2/14/2019  1:07 PM  
   
Peripheral IV 02/14/19 Left Antecubital (Active) Site Assessment Clean, dry, & intact 2/14/2019 10:31 PM  
Phlebitis Assessment 0 2/14/2019 10:31 PM  
Infiltration Assessment 0 2/14/2019 10:31 PM  
Dressing Status Clean, dry, & intact 2/14/2019 10:31 PM  
Dressing Type Transparent; Topical skin adhesive 2/14/2019 10:31 PM  
Hub Color/Line Status Pink;Capped;Flushed;Patent 2/14/2019 10:31 PM  
Action Taken Blood drawn 2/14/2019 10:31 PM  
  
 
Opportunity for questions and clarification was provided. Patient transported with: 
transport

## 2019-02-15 NOTE — PHYSICIAN ADVISORY
Short Stay Review Pt Name:  Garald Olszewski MR#  [id number] CSN#   [id number] Room and Hospital  566/01  @ [address] [facility]  
Hospitalization date  2/14/2019 12:34 PM 
No discharge date for patient encounter. Current Attending Physician  Vishal Mccann A discharge order has been placed for this episode of hospital care for Ms. Garald Olszewski; since this hospital stay is less than two midnights, I reviewed Ms. Corrinne Spear Mohr's chart. Ms. Bi Osei healthcare insurance/benefit include: 
Payor: [place] MED[employer]ARE / Plan: [id number] y / Product Type: Medicare /  
 
Utilization Review related case summary:  
Age  76 y.o.  
BMI  There is no height or weight on file to calculate BMI. PMHx includes Hospital course  The pt sustained recent crush injury to the same lower extremity where she developed severe pain, that didn't improve with PO narcotics and other measures dispensed in the ER. She was hospitalized for acute gout flare with uncontrolled pain Risk of deterioration at the time this patient  was hospitalized Low On the basis of chart review, this patient's hospitalization status Should be changed to OBSERVATION Tori Dao MD MPH FACP Cell : [phone] Physician Advisor Yovani  [address]  
Utilization Review, Care Management CSN:  [id number] MONA:   [id number] Admitted on :  2/14/2019 Discharge order

## 2019-02-15 NOTE — PROGRESS NOTES
Physical Therapy:  
 
Orders received, chart reviewed and patient evaluated by physical therapy. Recommend patient to discharge home pending progress with skilled acute care physical therapy. Recommend with nursing patient to complete as able in order to maintain strength, endurance and independence: OOB to chair 3x/day with assist of 1 and ambulating with assist of 1 using RW. Thank you for your assistance. Full evaluation to follow.   
 
Amaury Navarro, PT, DPT

## 2019-02-15 NOTE — H&P
H&P dictated. Job# [id number]. Addendum: 
FUNCTIONAL STATUS:  At baseline, patient is ambulatory without assistance; performs ADLs.

## 2019-02-15 NOTE — DISCHARGE INSTRUCTIONS
Discharge Instructions       PATIENT ID: Saran Muñoz  MRN: [id number]   YOB: 1945    DATE OF ADMISSION: 2/14/2019 12:34 PM    DATE OF DISCHARGE: 2/15/2019    PRIMARY CARE PROVIDER: Sri Mina MD     ATTENDING PHYSICIAN: Alicia Kevin*  DISCHARGING PROVIDER: Taisha Mondragon MD    To contact this individual call [phone] and ask the  to page. If unavailable ask to be transferred the Adult Hospitalist Department. DISCHARGE DIAGNOSES ***    CONSULTATIONS: ED CONSULT TO SENIOR SERVICES CASE MANAGEMENT  ED CONSULT TO SENIOR SERVICES PHYSICAL THERAPY  IP CONSULT TO PRIMARY CARE PROVIDER  ED CONSULT TO SENIOR SERVICES CASE MANAGEMENT  IP CONSULT TO VASCULAR SURGERY  IP CONSULT TO ORTHOPEDIC SURGERY    PROCEDURES/SURGERIES: * No surgery found *    PENDING TEST RESULTS:   At the time of discharge the following test results are still pending: ***    FOLLOW UP APPOINTMENTS:   Follow-up Information     Follow up With Specialties Details Why Contact Info    Sri Mina MD Family Practice In 1 week Hospital follow up  [address]: ***    DIET: {diet:17062}  Oral Nutritional Supplements: {RDSUPPLEMENTLIST:20094} {RDSUPPFREQUENCY:20080}     ACTIVITY: {discharge activity:89307}    WOUND CARE: ***    EQUIPMENT needed: ***      DISCHARGE MEDICATIONS:   See Medication Reconciliation Form    · It is important that you take the medication exactly as they are prescribed. · Keep your medication in the bottles provided by the pharmacist and keep a list of the medication names, dosages, and times to be taken in your wallet. · Do not take other medications without consulting your doctor. NOTIFY YOUR PHYSICIAN FOR ANY OF THE FOLLOWING:   Fever over 101 degrees for 24 hours.    Chest pain, shortness of breath, fever, chills, nausea, vomiting, diarrhea, change in mentation, falling, weakness, bleeding. Severe pain or pain not relieved by medications. Or, any other signs or symptoms that you may have questions about. DISPOSITION:  X  Home With:   OT X PT X HH  RN       SNF/Inpatient Rehab/LTAC    Independent/assisted living    Hospice    Other:     CDMP Checked: Yes X     PROBLEM LIST Updated:   Yes X       Signed:   Ramona Beltran MD  2/15/2019  1:15 PM
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
## 2019-02-15 NOTE — PROGRESS NOTES
Dr. Jimbo Saleem and Dr. Radha Rossi aware that ankle brachial index machine is broken. Patient okay for discharge without ankle brachial index being completed.

## 2019-02-15 NOTE — PROGRESS NOTES
Problem: Mobility Impaired (Adult and Pediatric) Goal: *Acute Goals and Plan of Care (Insert Text) Physical Therapy Goals Initiated 2/15/2019 1. Patient will move from supine to sit and sit to supine , scoot up and down and roll side to side in bed with modified independence within 7 day(s). 2.  Patient will transfer from bed to chair and chair to bed with modified independence using the least restrictive device within 7 day(s). 3.  Patient will perform sit to stand with modified independence within 7 day(s). 4.  Patient will ambulate with modified independence for 150 feet with the least restrictive device within 7 day(s). 5.  Patient will ascend/descend 12 stairs with 1 handrail(s) with contact guard assist within 7 day(s). physical Therapy TREATMENT Patient: Barbara Ramirez (26 y.o. female) Date: 2/15/2019 Diagnosis: Acute leg pain, right [M79.604] Crushing injury of right leg [S87.81XA] Traumatic hematoma of right lower leg [S80.11XA] Unable to balance when standing [R27.8] Gout flare [M10.9] Gout flare Precautions:   
Chart, physical therapy assessment, plan of care and goals were reviewed. ASSESSMENT: 
Chart reviewed, Rn approved, patient received lying in bed agreeable to work with therapy. Patient seen for pm mobility session in order to clear stairs for discharge today. Patient able to safely navigate 4 stairs with B handrail with CGA. Educated on sequencing/technique, and use of SPC for stairs with 1 handrail. Patient has equipment needs and no further mobility questions at this time. Patient cleared for d/c from a PT standpoint. [facility]. Progression toward goals: 
[x]    Improving appropriately and progressing toward goals 
[]    Improving slowly and progressing toward goals 
[]    Not making progress toward goals and plan of care will be adjusted PLAN: 
Patient continues to benefit from skilled intervention to address the above impairments. Continue treatment per established plan of care. Discharge Recommendations:  Home Health Further Equipment Recommendations for Discharge:  None SUBJECTIVE:  
Patient stated It's more sore than it was earlier.  OBJECTIVE DATA SUMMARY:  
Critical Behavior: 
Neurologic State: Alert, Appropriate for age Orientation Level: Appropriate for age, Oriented X4 Cognition: Appropriate decision making, Appropriate for age attention/concentration, Appropriate safety awareness, Follows commands Functional Mobility Training: 
Bed Mobility: 
  
Supine to Sit: Stand-by assistance; Additional time Sit to Supine: (ended in chair) Transfers: 
Sit to Stand: Contact guard assistance Stand to Sit: Contact guard assistance Balance: 
  
Ambulation/Gait Training: 
Distance (ft): 30 Feet (ft) Assistive Device: Gait belt;Walker, rolling Ambulation - Level of Assistance: Contact guard assistance Gait Abnormalities: Antalgic;Decreased step clearance;Shuffling gait; Step to gait Base of Support: Narrowed Stance: Right decreased Speed/Ibis: Shuffled; Slow Step Length: Right shortened;Left shortened Swing Pattern: Left asymmetrical 
  
  
  
  
  
Stairs: 
Number of Stairs Trained: 4 Stairs - Level of Assistance: Contact guard assistance Rail Use: Both Activity Tolerance:  
NAD Please refer to the flowsheet for vital signs taken during this treatment. After treatment:  
[x]    Patient left in no apparent distress sitting up in chair 
[]    Patient left in no apparent distress in bed 
[x]    Call bell left within reach [x]    Nursing notified 
[]    Caregiver present 
[]    Bed alarm activated COMMUNICATION/COLLABORATION:  
The patients plan of care was discussed with: Registered Nurse Lana Quinn, PT, DPT Time Calculation: 27 mins

## 2019-02-15 NOTE — ROUTINE PROCESS
Bedside shift change report given to Jacquelyn Linder RN (oncoming nurse) by Minda Fierro RN(offgoing nurse). Report given with SBAR.
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
## 2019-02-15 NOTE — PROGRESS NOTES
Chart reviewed. Noted referral was sent to [address] by previous CRM. CRM spoke with Ximena Santacruz with [address]. CRM will need specific home health orders in the chart. Spoke with PT, patient still needs to complete stair training. CM will follow. 12:18 PM: Spoke with Ximena Santacruz with [address]. They can accept patient with a start of care on Monday. They will need orders to state that MD is ok with [facility] Monday. CM met with patient and verified she is okay with a delayed SOC. 
 
2:00 PM: CM spoke with MD and verified that [facility] Monday is acceptable. CM called and left message for Ximena Santacruz with [address] to notify. JAVON DegrootW/JEAN PAUL

## 2019-02-15 NOTE — ED NOTES
R leg elevated with multiple pillows and applied three ice packs to R foot and lower leg as ordered by Jayleen Metzger

## 2019-02-15 NOTE — ACP (ADVANCE CARE PLANNING)
visit for Advance Medical Directive (AMD) consult. Upon review of the pt's chart, Mrs. Michael Alonzo already has a completed AMD. [employer] went to follow up with pt to check, and pt was resting and did not awake to her name being called. Please contact [phone] [facility] for further support.  follow up as needed.      Oumar Francisco, MACE   [phone]

## 2019-02-15 NOTE — PROGRESS NOTES
I have reviewed discharge instructions with the patient. The patient verbalized understanding. Signed discharge on paper chart

## 2019-02-15 NOTE — CONSULTS
Vascular Surgery Consult  --full note to follow  --in brief:  Unusual crush injury of right lower leg but her main complaint is toe pain  --toe does not appear ischemic to me  --has a palpable dp pulse and popliteal pulse  --agree that gout makes sense here (apparently has had this multiple times per patient)  --I have ordered an JOE nonetheless.
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
## 2019-02-18 ENCOUNTER — HOME CARE VISIT (OUTPATIENT)
Dept: SCHEDULING | Facility: HOME HEALTH | Age: 74
End: 2019-02-18
Payer: MEDICARE

## 2019-02-18 PROCEDURE — 3331090001 HH PPS REVENUE CREDIT

## 2019-02-18 PROCEDURE — G0151 HHCP-SERV OF PT,EA 15 MIN: HCPCS

## 2019-02-18 PROCEDURE — 400013 HH SOC

## 2019-02-18 PROCEDURE — 3331090002 HH PPS REVENUE DEBIT

## 2019-02-19 PROCEDURE — 3331090001 HH PPS REVENUE CREDIT

## 2019-02-19 PROCEDURE — 3331090002 HH PPS REVENUE DEBIT

## 2019-02-20 ENCOUNTER — HOME CARE VISIT (OUTPATIENT)
Dept: SCHEDULING | Facility: HOME HEALTH | Age: 74
End: 2019-02-20
Payer: MEDICARE

## 2019-02-20 PROCEDURE — G0151 HHCP-SERV OF PT,EA 15 MIN: HCPCS

## 2019-02-20 PROCEDURE — 3331090003 HH PPS REVENUE ADJ

## 2019-02-20 PROCEDURE — 3331090002 HH PPS REVENUE DEBIT

## 2019-02-20 PROCEDURE — 3331090001 HH PPS REVENUE CREDIT

## 2019-02-22 VITALS
TEMPERATURE: 98.2 F | SYSTOLIC BLOOD PRESSURE: 128 MMHG | HEART RATE: 82 BPM | OXYGEN SATURATION: 98 % | DIASTOLIC BLOOD PRESSURE: 76 MMHG

## 2019-08-14 ENCOUNTER — HOSPITAL ENCOUNTER (OUTPATIENT)
Dept: GENERAL RADIOLOGY | Age: 74
Discharge: HOME | End: 2019-08-14
Attending: INTERNAL MEDICINE
Payer: MEDICARE

## 2019-08-14 DIAGNOSIS — R06.02 SOB (SHORTNESS OF BREATH): ICD-10-CM

## 2019-08-14 PROCEDURE — 71046 X-RAY EXAM CHEST 2 VIEWS: CPT

## 2019-09-25 ENCOUNTER — HOSPITAL ENCOUNTER (OUTPATIENT)
Dept: CT IMAGING | Age: 74
Discharge: HOME | End: 2019-09-25
Attending: INTERNAL MEDICINE
Payer: MEDICARE

## 2019-09-25 DIAGNOSIS — R06.02 SOB (SHORTNESS OF BREATH): ICD-10-CM

## 2019-09-25 PROCEDURE — 71250 CT THORAX DX C-: CPT

## 2020-07-14 ENCOUNTER — HOSPITAL ENCOUNTER (OUTPATIENT)
Dept: CT IMAGING | Age: 75
Discharge: HOME | End: 2020-07-14
Attending: INTERNAL MEDICINE
Payer: MEDICARE

## 2020-07-14 DIAGNOSIS — R91.8 MULTIPLE PULMONARY NODULES DETERMINED BY COMPUTED TOMOGRAPHY OF LUNG: ICD-10-CM

## 2020-07-14 PROCEDURE — 71250 CT THORAX DX C-: CPT

## 2021-03-31 NOTE — PROGRESS NOTES
Spiritual Care Partner Volunteer visited patient in Gen Surg on December 12, 2018.   Documented by:  SERENA Mcnulty, [facility], Chaplain JOVANY MAZA [facility] Paging Service  [phone] No

## 2022-03-18 PROBLEM — M79.604 ACUTE LEG PAIN, RIGHT: Status: ACTIVE | Noted: 2019-02-14

## 2022-03-18 PROBLEM — M10.9 GOUT FLARE: Status: ACTIVE | Noted: 2019-02-14

## 2022-03-19 PROBLEM — S80.11XA TRAUMATIC HEMATOMA OF RIGHT LOWER LEG: Status: ACTIVE | Noted: 2019-02-14

## 2022-03-19 PROBLEM — I70.202 FEMORAL ARTERY OCCLUSION, LEFT (HCC): Status: ACTIVE | Noted: 2018-12-10

## 2022-03-19 PROBLEM — R27.8 UNABLE TO BALANCE WHEN STANDING: Status: ACTIVE | Noted: 2019-02-14

## 2022-03-20 PROBLEM — S87.81XA CRUSHING INJURY OF RIGHT LEG: Status: ACTIVE | Noted: 2019-02-14

## 2022-11-27 ENCOUNTER — APPOINTMENT (OUTPATIENT)
Dept: CT IMAGING | Age: 77
End: 2022-11-27
Attending: EMERGENCY MEDICINE
Payer: MEDICARE

## 2022-11-27 ENCOUNTER — HOSPITAL ENCOUNTER (EMERGENCY)
Age: 77
Discharge: HOME | End: 2022-11-27
Attending: EMERGENCY MEDICINE
Payer: MEDICARE

## 2022-11-27 VITALS
BODY MASS INDEX: 35.12 KG/M2 | TEMPERATURE: 98.1 F | RESPIRATION RATE: 20 BRPM | SYSTOLIC BLOOD PRESSURE: 118 MMHG | OXYGEN SATURATION: 94 % | DIASTOLIC BLOOD PRESSURE: 70 MMHG | WEIGHT: 186 LBS | HEART RATE: 95 BPM | HEIGHT: 61 IN

## 2022-11-27 DIAGNOSIS — K52.9 COLITIS: Primary | ICD-10-CM

## 2022-11-27 DIAGNOSIS — R19.4 BOWEL HABIT CHANGES: ICD-10-CM

## 2022-11-27 LAB
ANION GAP SERPL CALC-SCNC: 9 MMOL/L (ref 5–15)
BASOPHILS # BLD: 0 K/UL (ref 0–0.1)
BASOPHILS NFR BLD: 0 % (ref 0–1)
BUN SERPL-MCNC: 18 MG/DL (ref 6–20)
BUN/CREAT SERPL: 14 (ref 12–20)
CALCIUM SERPL-MCNC: 9.8 MG/DL (ref 8.5–10.1)
CHLORIDE SERPL-SCNC: 90 MMOL/L (ref 97–108)
CO2 SERPL-SCNC: 35 MMOL/L (ref 21–32)
CREAT SERPL-MCNC: 1.25 MG/DL (ref 0.55–1.02)
DIFFERENTIAL METHOD BLD: ABNORMAL
EOSINOPHIL # BLD: 0.5 K/UL (ref 0–0.4)
EOSINOPHIL NFR BLD: 5 % (ref 0–7)
ERYTHROCYTE [DISTWIDTH] IN BLOOD BY AUTOMATED COUNT: 16.1 % (ref 11.5–14.5)
GLUCOSE SERPL-MCNC: 101 MG/DL (ref 65–100)
HCT VFR BLD AUTO: 36.1 % (ref 35–47)
HGB BLD-MCNC: 11.3 G/DL (ref 11.5–16)
IMM GRANULOCYTES # BLD AUTO: 0.1 K/UL (ref 0–0.04)
IMM GRANULOCYTES NFR BLD AUTO: 1 % (ref 0–0.5)
LIPASE SERPL-CCNC: 58 U/L (ref 73–393)
LYMPHOCYTES # BLD: 1.2 K/UL (ref 0.8–3.5)
LYMPHOCYTES NFR BLD: 10 % (ref 12–49)
MCH RBC QN AUTO: 26.5 PG (ref 26–34)
MCHC RBC AUTO-ENTMCNC: 31.3 G/DL (ref 30–36.5)
MCV RBC AUTO: 84.7 FL (ref 80–99)
MONOCYTES # BLD: 0.8 K/UL (ref 0–1)
MONOCYTES NFR BLD: 7 % (ref 5–13)
NEUTS SEG # BLD: 9.3 K/UL (ref 1.8–8)
NEUTS SEG NFR BLD: 77 % (ref 32–75)
NRBC # BLD: 0 K/UL (ref 0–0.01)
NRBC BLD-RTO: 0 PER 100 WBC
PLATELET # BLD AUTO: 591 K/UL (ref 150–400)
PMV BLD AUTO: 10.4 FL (ref 8.9–12.9)
POTASSIUM SERPL-SCNC: 3.7 MMOL/L (ref 3.5–5.1)
RBC # BLD AUTO: 4.26 M/UL (ref 3.8–5.2)
SODIUM SERPL-SCNC: 134 MMOL/L (ref 136–145)
WBC # BLD AUTO: 11.9 K/UL (ref 3.6–11)

## 2022-11-27 PROCEDURE — 74011000636 HC RX REV CODE- 636: Performed by: EMERGENCY MEDICINE

## 2022-11-27 PROCEDURE — 80048 BASIC METABOLIC PNL TOTAL CA: CPT

## 2022-11-27 PROCEDURE — 85025 COMPLETE CBC W/AUTO DIFF WBC: CPT

## 2022-11-27 PROCEDURE — 74011000250 HC RX REV CODE- 250: Performed by: EMERGENCY MEDICINE

## 2022-11-27 PROCEDURE — 96374 THER/PROPH/DIAG INJ IV PUSH: CPT

## 2022-11-27 PROCEDURE — 36415 COLL VENOUS BLD VENIPUNCTURE: CPT

## 2022-11-27 PROCEDURE — 99285 EMERGENCY DEPT VISIT HI MDM: CPT

## 2022-11-27 PROCEDURE — 83690 ASSAY OF LIPASE: CPT

## 2022-11-27 PROCEDURE — 74177 CT ABD & PELVIS W/CONTRAST: CPT

## 2022-11-27 PROCEDURE — 74011250636 HC RX REV CODE- 250/636: Performed by: EMERGENCY MEDICINE

## 2022-11-27 RX ORDER — METRONIDAZOLE 500 MG/1
500 TABLET ORAL 2 TIMES DAILY
Qty: 14 TABLET | Refills: 0 | Status: SHIPPED | OUTPATIENT
Start: 2022-11-27 | End: 2022-12-04

## 2022-11-27 RX ORDER — ENEMA 19; 7 G/133ML; G/133ML
1 ENEMA RECTAL
Qty: 133 ML | Refills: 0 | Status: SHIPPED | OUTPATIENT
Start: 2022-11-27 | End: 2022-11-27

## 2022-11-27 RX ORDER — GLYCERIN ADULT
1 SUPPOSITORY, RECTAL RECTAL DAILY
Qty: 5 SUPPOSITORY | Refills: 0 | Status: SHIPPED | OUTPATIENT
Start: 2022-11-27 | End: 2022-12-02

## 2022-11-27 RX ORDER — CIPROFLOXACIN 500 MG/1
500 TABLET ORAL 2 TIMES DAILY
Qty: 14 TABLET | Refills: 0 | Status: SHIPPED | OUTPATIENT
Start: 2022-11-27 | End: 2022-12-04

## 2022-11-27 RX ADMIN — SODIUM CHLORIDE, PRESERVATIVE FREE 1 G: 5 INJECTION INTRAVENOUS at 18:45

## 2022-11-27 RX ADMIN — IOPAMIDOL 100 ML: 755 INJECTION, SOLUTION INTRAVENOUS at 17:55

## 2022-11-27 NOTE — ED PROVIDER NOTES
77F w/ hx HTN and hypothyroidism p/w 3wks bowel habit change. Pt states that hasn't been able to have a BM in 3wks. Has had very poor appetite and little PO intake. Passing gas normally. No N/V, F/C, cough, dyspnea or chest pain. No dizziness or syncope. Has had right sided abd achiness pain for 3wks. Had elective right knee surgery 3wks ago c/b post up urinary retention and has had a olmedo cath x2wks. Denies any dysuria, urine cloudiness or sediment. Has no complaints to her right knee including no swelling, redness, bleeding or drainage. States that her right knee is \"the only thing that is working. \"       Past Medical History:   Diagnosis Date    Hypertension     Psychiatric disorder     anxiety    Thyroid disease     Hypothyroid       Past Surgical History:   Procedure Laterality Date    HX GYN      tubal ligation    HX HEENT  2003    embolization for nosebleed    HX KNEE REPLACEMENT Right     HX TONSILLECTOMY      age 18    [place] TOTAL HIP ARTHROPLASTY Left 2015    L THR    VASCULAR SURGERY PROCEDURE UNLIST      temproal artery biopsy    VASCULAR SURGERY PROCEDURE UNLIST  9141    stents (umbilical area); Dr. Rozina Lilly         Family History:   Problem Relation Age of Onset    Heart Failure Mother     COPD Mother     Alcohol abuse Father     Tuberculosis Father     Liver Disease Brother     No Known Problems Brother        Social History     Socioeconomic History    Marital status:      Spouse name: Not on file    Number of children: Not on file    Years of education: Not on file    Highest education level: Not on file   Occupational History    Not on file   Tobacco Use    Smoking status: Former     Packs/day: 0.50     Years: 20.00     Pack years: 10.00     Types: Cigarettes     Quit date: 2003     Years since quittin.5    Smokeless tobacco: Never   Substance and Sexual Activity    Alcohol use: Yes     Comment:  Occ    Drug use: No    Sexual activity: Not on file   Other Topics Concern    Not on file   Social History Narrative    Not on file     Social Determinants of Health     Financial Resource Strain: Not on file   Food Insecurity: Not on file   Transportation Needs: Not on file   Physical Activity: Not on file   Stress: Not on file   Social Connections: Not on file   Intimate Partner Violence: Not on file   Housing Stability: Not on file         ALLERGIES: Patient has no known allergies. Review of Systems   Constitutional:  Negative for chills, diaphoresis and fever. HENT:  Negative for facial swelling, mouth sores, nosebleeds, trouble swallowing and voice change. Eyes:  Negative for pain and visual disturbance. Respiratory:  Negative for apnea, cough, choking, shortness of breath, wheezing and stridor. Cardiovascular:  Negative for chest pain, palpitations and leg swelling. Gastrointestinal:  Positive for abdominal pain. Negative for abdominal distention, blood in stool, diarrhea, nausea and vomiting. Genitourinary:  Negative for difficulty urinating, dysuria, flank pain, hematuria and pelvic pain. Musculoskeletal:  Negative for joint swelling. Skin:  Negative for color change and rash. Allergic/Immunologic: Negative for immunocompromised state. Neurological:  Negative for dizziness, seizures, syncope, speech difficulty and light-headedness. Hematological:  Does not bruise/bleed easily. Psychiatric/Behavioral:  Negative for agitation and behavioral problems. Vitals:    11/27/22 1626   BP: 110/65   Pulse: 95   Resp: 20   Temp: 98.1 °F (36.7 °C)   SpO2: 94%   Weight: 84.4 kg (186 lb)   Height: 5' 1\" (1.549 m)            Physical Exam  Vitals and nursing note reviewed. Constitutional:       General: She is not in acute distress. Appearance: Normal appearance. She is not ill-appearing or toxic-appearing. HENT:      Head: Normocephalic and atraumatic.       Right Ear: External ear normal.      Left Ear: External ear normal.      Nose: Nose normal.      Mouth/Throat: Mouth: Mucous membranes are moist.      Pharynx: Oropharynx is clear. No oropharyngeal exudate or posterior oropharyngeal erythema. Eyes:      General: No scleral icterus. Extraocular Movements: Extraocular movements intact. Conjunctiva/sclera: Conjunctivae normal.      Pupils: Pupils are equal, round, and reactive to light. Cardiovascular:      Rate and Rhythm: Normal rate and regular rhythm. Pulses: Normal pulses. Heart sounds: Normal heart sounds. No murmur heard. No friction rub. No gallop. Pulmonary:      Effort: Pulmonary effort is normal. No respiratory distress. Breath sounds: Normal breath sounds. No stridor. No wheezing, rhonchi or rales. Abdominal:      General: There is distension. Palpations: Abdomen is soft. Tenderness: There is no abdominal tenderness. There is no guarding or rebound. Genitourinary:     Comments: Wolfe cath in place draining dark yellow urine  Musculoskeletal:         General: No tenderness or deformity. Normal range of motion. Cervical back: Normal range of motion and neck supple. No rigidity. Right lower leg: No edema. Left lower leg: No edema. Skin:     General: Skin is warm. Capillary Refill: Capillary refill takes less than 2 seconds. Coloration: Skin is not jaundiced. Comments: Right knee surgical site appearing C/D/I  No erythema, edema, drainage, point tenderness  Full ROM  BLE motor/sensory and distal pulses intact   Neurological:      General: No focal deficit present. Mental Status: She is alert. Cranial Nerves: No cranial nerve deficit. Sensory: No sensory deficit. Motor: No weakness. Coordination: Coordination normal.   Psychiatric:         Mood and Affect: Mood normal.         Behavior: Behavior normal.         Thought Content:  Thought content normal.         Judgment: Judgment normal.      LABORATORY TESTS:  Admission on 11/27/2022, Discharged on 11/27/2022 Component Date Value Ref Range Status    WBC 11/27/2022 11.9 (A)  3.6 - 11.0 K/uL Final    RBC 11/27/2022 4.26  3.80 - 5.20 M/uL Final    HGB 11/27/2022 11.3 (A)  11.5 - 16.0 g/dL Final    HCT 11/27/2022 36.1  35.0 - 47.0 % Final    MCV 11/27/2022 84.7  80.0 - 99.0 FL Final    MCH 11/27/2022 26.5  26.0 - 34.0 PG Final    MCHC 11/27/2022 31.3  30.0 - 36.5 g/dL Final    RDW 11/27/2022 16.1 (A)  11.5 - 14.5 % Final    PLATELET 92/61/2518 656 (A)  150 - 400 K/uL Final    MPV 11/27/2022 10.4  8.9 - 12.9 FL Final    NRBC 11/27/2022 0.0  0  WBC Final    ABSOLUTE NRBC 11/27/2022 0.00  0.00 - 0.01 K/uL Final    NEUTROPHILS 11/27/2022 77 (A)  32 - 75 % Final    LYMPHOCYTES 11/27/2022 10 (A)  12 - 49 % Final    MONOCYTES 11/27/2022 7  5 - 13 % Final    EOSINOPHILS 11/27/2022 5  0 - 7 % Final    BASOPHILS 11/27/2022 0  0 - 1 % Final    IMMATURE GRANULOCYTES 11/27/2022 1 (A)  0.0 - 0.5 % Final    ABS. NEUTROPHILS 11/27/2022 9.3 (A)  1.8 - 8.0 K/UL Final    ABS. LYMPHOCYTES 11/27/2022 1.2  0.8 - 3.5 K/UL Final    ABS. MONOCYTES 11/27/2022 0.8  0.0 - 1.0 K/UL Final    ABS. EOSINOPHILS 11/27/2022 0.5 (A)  0.0 - 0.4 K/UL Final    ABS. BASOPHILS 11/27/2022 0.0  0.0 - 0.1 K/UL Final    ABS. IMM. GRANS. 11/27/2022 0.1 (A)  0.00 - 0.04 K/UL Final    DF 11/27/2022 AUTOMATED    Final    Sodium 11/27/2022 134 (A)  136 - 145 mmol/L Final    Potassium 11/27/2022 3.7  3.5 - 5.1 mmol/L Final    Chloride 11/27/2022 90 (A)  97 - 108 mmol/L Final    CO2 11/27/2022 35 (A)  21 - 32 mmol/L Final    Anion gap 11/27/2022 9  5 - 15 mmol/L Final    Glucose 11/27/2022 101 (A)  65 - 100 mg/dL Final    BUN 11/27/2022 18  6 - 20 MG/DL Final    Creatinine 11/27/2022 1.25 (A)  0.55 - 1.02 MG/DL Final    BUN/Creatinine ratio 11/27/2022 14  12 - 20   Final    eGFR 11/27/2022 44 (A)  >60 ml/min/1.73m2 Final    Comment:      Pediatric calculator link: Rosemary.[email or web address] org/professionals/kdoqi/gfr_calculatorped       Effective Oct 3, 2022       These results are not intended for use in patients <25years of age. eGFR results are calculated without a race factor using  the 2021 CKD-EPI equation. Careful clinical correlation is recommended, particularly when comparing to results calculated using previous equations. The CKD-EPI equation is less accurate in patients with extremes of muscle mass, extra-renal metabolism of creatinine, excessive creatine ingestion, or following therapy that affects renal tubular secretion. Calcium 11/27/2022 9.8  8.5 - 10.1 MG/DL Final    Lipase 11/27/2022 58 (A)  73 - 393 U/L Final       IMAGING RESULTS:  CT ABD PELV W CONT   Final Result   No bowel obstruction. Colitis of the distal transverse colon at splenic flexure. No perforation or abscess. MEDICATIONS GIVEN:  Medications   iopamidoL (ISOVUE-370) 76 % injection 100 mL (100 mL IntraVENous Given 11/27/22 1755)   cefTRIAXone (ROCEPHIN) 1 g in 0.9% sodium chloride 10 mL IV syringe (1 g IntraVENous Given 11/27/22 1845)       IMPRESSION:  1. Colitis    2. Bowel habit changes        PLAN:  - Discharge    Minda Lopes MD      MDM  Number of Diagnoses or Management Options  Bowel habit changes  Colitis  Diagnosis management comments: 77F w/ hx HTN and hypothyroidism p/w 3wks bowel habit change. Pt nontoxic appearing, afebrile, hemodynamically stable w/o resp distress or hypoxia. No peritonitis on exam. Right knee surgical site appearing C/D/I. Ddx includes appendicitis vs pyelo/UTI vs kidney stone vs acute cholecystitis/choledocholithiasis/cholangitis vs diverticulitis/colitis vs obstruction vs volvulus/intussusception vs incarcerated/strangulated hernia vs pancreatitis vs PUD vs gastritis, less likely ACS, AAA or mesenteric ischemia/ischemic bowel based on presentation. Ordered CTAP and labs. Monitor and reassess. 1900 Pt remains stable. Labs show trace leukocytosis and mild renal insufficiency but otherwse unremarkable.  Has had olmedo cath for 2wks that is being removed in urology office later this week. Family reports that it was a difficult olmedo placement so will not remove today. CTAP showing colitis w/o perf or abscess. Will start on cipro/flagyl. Also started on a bowel regimen. Advised to follow up w/ GI as well as urology. Patient given specific return precautions and explained signs/symptoms for which to come back to ED immediately but otherwise advised to f/u w/ PCP over next 2-3days.   c       Amount and/or Complexity of Data Reviewed  Clinical lab tests: ordered and reviewed  Tests in the radiology section of CPT®: reviewed and ordered  Tests in the medicine section of CPT®: reviewed and ordered  Independent visualization of images, tracings, or specimens: yes    Risk of Complications, Morbidity, and/or Mortality  Presenting problems: moderate  Diagnostic procedures: moderate  Management options: moderate           Procedures

## 2022-11-27 NOTE — ED TRIAGE NOTES
Patient is 3 weeks post op from right knee surgery. Patient has not had a bowel movement since. Patient reports passing gas. Denies N/V. Patient returned to ER 1 week post op for SOB and was admitted for FABY. Wolfe is in place.

## 2022-11-28 NOTE — ED NOTES
Pt discharged in stable condition at this time. MD/HERBERT reviewed discharge instructions, prescriptions, and follow up with patient at bedside. Pt verbalized understanding and denies any needs or questions at this time. Pt NAD on DC assisted via WC to the vehicle per her baseline.  Pt to be driven home by her

## 2024-05-23 ENCOUNTER — HOSPITAL ENCOUNTER (OUTPATIENT)
Facility: HOSPITAL | Age: 79
Discharge: HOME | End: 2024-05-23
Payer: MEDICARE

## 2024-05-23 ENCOUNTER — TRANSCRIBE ORDERS (OUTPATIENT)
Facility: HOSPITAL | Age: 79
End: 2024-05-23

## 2024-05-23 DIAGNOSIS — J44.9 CHRONIC OBSTRUCTIVE PULMONARY DISEASE, UNSPECIFIED COPD TYPE (HCC): Primary | ICD-10-CM

## 2024-05-23 DIAGNOSIS — J44.9 CHRONIC OBSTRUCTIVE PULMONARY DISEASE, UNSPECIFIED COPD TYPE (HCC): ICD-10-CM

## 2024-05-23 PROCEDURE — 71046 X-RAY EXAM CHEST 2 VIEWS: CPT

## (undated) DEVICE — SYR 3ML LL TIP 1/10ML GRAD --

## (undated) DEVICE — REM POLYHESIVE ADULT PATIENT RETURN ELECTRODE: Brand: VALLEYLAB

## (undated) DEVICE — DRAPE,REIN 53X77,STERILE: Brand: MEDLINE

## (undated) DEVICE — 3M™ IOBAN™ 2 ANTIMICROBIAL INCISE DRAPE 6648EZ: Brand: IOBAN™ 2

## (undated) DEVICE — SOLUTION IV 500ML 0.9% SOD CHL FLX CONT

## (undated) DEVICE — BLADE ASSEMB CLP HAIR FINE --

## (undated) DEVICE — TOWEL SURG W17XL27IN STD BLU COT NONFENESTRATED PREWASHED

## (undated) DEVICE — AGENT HEMSTAT W4XL4IN OXIDIZED REGENERATED CELOS ABSRB SFT

## (undated) DEVICE — NEEDLE HYPO 18GA L1.5IN PNK S STL HUB POLYPR SHLD REG BVL

## (undated) DEVICE — TELFA NON-ADHERENT PADS PREPAK: Brand: TELFA

## (undated) DEVICE — SPONGE LAP 18X18IN STRL -- 5/PK

## (undated) DEVICE — (D)PREP SKN CHLRAPRP APPL 26ML -- CONVERT TO ITEM 371833

## (undated) DEVICE — DEVON™ KNEE AND BODY STRAP 60" X 3" (1.5 M X 7.6 CM): Brand: DEVON

## (undated) DEVICE — DRAPE XR C ARM 41X74IN LF --

## (undated) DEVICE — SUTURE PROL SZ 6-0 L24IN NONABSORBABLE BLU L9.3MM BV-1 VISI 8305H

## (undated) DEVICE — SUTURE ABSORBABLE BRAIDED 2-0 CT-1 27 IN UD VICRYL J259H

## (undated) DEVICE — SUTURE PERMAHAND SZ 3-0 L30IN NONABSORBABLE BLK SILK BRAID A304H

## (undated) DEVICE — STERILE POLYISOPRENE POWDER-FREE SURGICAL GLOVES: Brand: PROTEXIS

## (undated) DEVICE — SUTURE VCRL SZ 3-0 L27IN ABSRB UD L26MM SH 1/2 CIR J416H

## (undated) DEVICE — SOLUTION IRRIG 1000ML H2O STRL BLT

## (undated) DEVICE — PRESSURE MONITORING SET: Brand: TRUWAVE

## (undated) DEVICE — HANDLE LT SNAP ON ULT DURABLE LENS FOR TRUMPF ALC DISPOSABLE

## (undated) DEVICE — 1200 GUARD II KIT W/5MM TUBE W/O VAC TUBE: Brand: GUARDIAN

## (undated) DEVICE — INFECTION CONTROL KIT SYS

## (undated) DEVICE — INTRODUCER SHTH 7FR CANN L5.5CM DIL TIP 35MM ORNG TUNGSTEN

## (undated) DEVICE — PART NUMBER 108260, VTI 8 MHZ DISPOSABLE DOPPLER PROBE, STRAIGHT, BOX: Brand: VTI 8 MHZ DISPOSABLE DOPPLER PROBE, STRAIGHT, BOX

## (undated) DEVICE — SUTURE PERMAHAND SZ 2-0 L12X18IN NONABSORBABLE BLK SILK A185H

## (undated) DEVICE — SUT ETHLN 4-0 18IN PS2 BLK --

## (undated) DEVICE — Device